# Patient Record
Sex: MALE | Race: WHITE | Employment: OTHER | URBAN - METROPOLITAN AREA
[De-identification: names, ages, dates, MRNs, and addresses within clinical notes are randomized per-mention and may not be internally consistent; named-entity substitution may affect disease eponyms.]

---

## 2017-01-01 ENCOUNTER — TELEPHONE (OUTPATIENT)
Dept: INTERNAL MEDICINE CLINIC | Facility: CLINIC | Age: 82
End: 2017-01-01

## 2017-01-01 ENCOUNTER — ANTI-COAG VISIT (OUTPATIENT)
Dept: INTERNAL MEDICINE CLINIC | Facility: CLINIC | Age: 82
End: 2017-01-01

## 2017-01-01 ENCOUNTER — PRIOR ORIGINAL RECORDS (OUTPATIENT)
Dept: OTHER | Age: 82
End: 2017-01-01

## 2017-01-01 ENCOUNTER — SNF VISIT (OUTPATIENT)
Dept: INTERNAL MEDICINE CLINIC | Facility: SKILLED NURSING FACILITY | Age: 82
End: 2017-01-01

## 2017-01-01 ENCOUNTER — OFFICE VISIT (OUTPATIENT)
Dept: INTERNAL MEDICINE CLINIC | Facility: CLINIC | Age: 82
End: 2017-01-01

## 2017-01-01 ENCOUNTER — APPOINTMENT (OUTPATIENT)
Dept: GENERAL RADIOLOGY | Facility: HOSPITAL | Age: 82
DRG: 820 | End: 2017-01-01
Attending: INTERNAL MEDICINE
Payer: MEDICARE

## 2017-01-01 ENCOUNTER — APPOINTMENT (OUTPATIENT)
Dept: CT IMAGING | Facility: HOSPITAL | Age: 82
DRG: 820 | End: 2017-01-01
Attending: NEUROLOGICAL SURGERY
Payer: MEDICARE

## 2017-01-01 ENCOUNTER — APPOINTMENT (OUTPATIENT)
Dept: GENERAL RADIOLOGY | Facility: HOSPITAL | Age: 82
DRG: 820 | End: 2017-01-01
Payer: MEDICARE

## 2017-01-01 ENCOUNTER — SURGERY (OUTPATIENT)
Age: 82
End: 2017-01-01

## 2017-01-01 ENCOUNTER — OFFICE VISIT (OUTPATIENT)
Dept: HEMATOLOGY/ONCOLOGY | Facility: HOSPITAL | Age: 82
End: 2017-01-01
Attending: INTERNAL MEDICINE
Payer: MEDICARE

## 2017-01-01 ENCOUNTER — APPOINTMENT (OUTPATIENT)
Dept: LAB | Age: 82
End: 2017-01-01
Attending: INTERNAL MEDICINE
Payer: MEDICARE

## 2017-01-01 ENCOUNTER — TELEPHONE (OUTPATIENT)
Dept: HEMATOLOGY/ONCOLOGY | Facility: HOSPITAL | Age: 82
End: 2017-01-01

## 2017-01-01 ENCOUNTER — OFFICE VISIT (OUTPATIENT)
Dept: RADIATION ONCOLOGY | Facility: HOSPITAL | Age: 82
End: 2017-01-01
Attending: RADIOLOGY
Payer: MEDICARE

## 2017-01-01 ENCOUNTER — APPOINTMENT (OUTPATIENT)
Dept: CT IMAGING | Facility: HOSPITAL | Age: 82
DRG: 871 | End: 2017-01-01
Attending: EMERGENCY MEDICINE
Payer: MEDICARE

## 2017-01-01 ENCOUNTER — TELEPHONE (OUTPATIENT)
Dept: RADIATION ONCOLOGY | Facility: HOSPITAL | Age: 82
End: 2017-01-01

## 2017-01-01 ENCOUNTER — APPOINTMENT (OUTPATIENT)
Dept: GENERAL RADIOLOGY | Facility: HOSPITAL | Age: 82
DRG: 820 | End: 2017-01-01
Attending: EMERGENCY MEDICINE
Payer: MEDICARE

## 2017-01-01 ENCOUNTER — HOSPITAL ENCOUNTER (INPATIENT)
Facility: HOSPITAL | Age: 82
LOS: 1 days | DRG: 871 | End: 2017-01-01
Attending: EMERGENCY MEDICINE | Admitting: INTERNAL MEDICINE
Payer: MEDICARE

## 2017-01-01 ENCOUNTER — APPOINTMENT (OUTPATIENT)
Dept: CT IMAGING | Facility: HOSPITAL | Age: 82
DRG: 820 | End: 2017-01-01
Attending: INTERNAL MEDICINE
Payer: MEDICARE

## 2017-01-01 ENCOUNTER — APPOINTMENT (OUTPATIENT)
Dept: GENERAL RADIOLOGY | Facility: HOSPITAL | Age: 82
DRG: 820 | End: 2017-01-01
Attending: HOSPITALIST
Payer: MEDICARE

## 2017-01-01 ENCOUNTER — APPOINTMENT (OUTPATIENT)
Dept: CT IMAGING | Facility: HOSPITAL | Age: 82
DRG: 820 | End: 2017-01-01
Attending: Other
Payer: MEDICARE

## 2017-01-01 ENCOUNTER — ANESTHESIA (OUTPATIENT)
Dept: SURGERY | Facility: HOSPITAL | Age: 82
DRG: 820 | End: 2017-01-01
Payer: MEDICARE

## 2017-01-01 ENCOUNTER — APPOINTMENT (OUTPATIENT)
Dept: CT IMAGING | Facility: HOSPITAL | Age: 82
DRG: 820 | End: 2017-01-01
Payer: MEDICARE

## 2017-01-01 ENCOUNTER — APPOINTMENT (OUTPATIENT)
Dept: RADIATION ONCOLOGY | Facility: HOSPITAL | Age: 82
DRG: 820 | End: 2017-01-01
Payer: MEDICARE

## 2017-01-01 ENCOUNTER — APPOINTMENT (OUTPATIENT)
Dept: GENERAL RADIOLOGY | Facility: HOSPITAL | Age: 82
DRG: 871 | End: 2017-01-01
Attending: EMERGENCY MEDICINE
Payer: MEDICARE

## 2017-01-01 ENCOUNTER — APPOINTMENT (OUTPATIENT)
Dept: CT IMAGING | Facility: HOSPITAL | Age: 82
DRG: 820 | End: 2017-01-01
Attending: HOSPITALIST
Payer: MEDICARE

## 2017-01-01 ENCOUNTER — APPOINTMENT (OUTPATIENT)
Dept: CV DIAGNOSTICS | Facility: HOSPITAL | Age: 82
DRG: 820 | End: 2017-01-01
Attending: INTERNAL MEDICINE
Payer: MEDICARE

## 2017-01-01 ENCOUNTER — ANESTHESIA EVENT (OUTPATIENT)
Dept: SURGERY | Facility: HOSPITAL | Age: 82
DRG: 820 | End: 2017-01-01
Payer: MEDICARE

## 2017-01-01 ENCOUNTER — OFFICE VISIT (OUTPATIENT)
Dept: PODIATRY CLINIC | Facility: CLINIC | Age: 82
End: 2017-01-01

## 2017-01-01 ENCOUNTER — TELEPHONE (OUTPATIENT)
Dept: ULTRASOUND IMAGING | Facility: HOSPITAL | Age: 82
End: 2017-01-01

## 2017-01-01 ENCOUNTER — HOSPITAL ENCOUNTER (INPATIENT)
Facility: HOSPITAL | Age: 82
LOS: 10 days | Discharge: NURSING, REHAB OR LONG-TERM CARE | DRG: 820 | End: 2017-01-01
Admitting: HOSPITALIST
Payer: MEDICARE

## 2017-01-01 VITALS
OXYGEN SATURATION: 97 % | RESPIRATION RATE: 20 BRPM | DIASTOLIC BLOOD PRESSURE: 74 MMHG | TEMPERATURE: 98 F | HEART RATE: 88 BPM | SYSTOLIC BLOOD PRESSURE: 101 MMHG

## 2017-01-01 VITALS — HEART RATE: 48 BPM | SYSTOLIC BLOOD PRESSURE: 114 MMHG | DIASTOLIC BLOOD PRESSURE: 56 MMHG | RESPIRATION RATE: 18 BRPM

## 2017-01-01 VITALS
HEART RATE: 78 BPM | OXYGEN SATURATION: 94 % | RESPIRATION RATE: 18 BRPM | HEIGHT: 68 IN | DIASTOLIC BLOOD PRESSURE: 64 MMHG | WEIGHT: 162 LBS | SYSTOLIC BLOOD PRESSURE: 100 MMHG | BODY MASS INDEX: 24.55 KG/M2 | TEMPERATURE: 99 F

## 2017-01-01 VITALS
TEMPERATURE: 97 F | RESPIRATION RATE: 25 BRPM | SYSTOLIC BLOOD PRESSURE: 42 MMHG | DIASTOLIC BLOOD PRESSURE: 11 MMHG | HEART RATE: 60 BPM | OXYGEN SATURATION: 55 %

## 2017-01-01 VITALS
TEMPERATURE: 97 F | DIASTOLIC BLOOD PRESSURE: 73 MMHG | WEIGHT: 139 LBS | BODY MASS INDEX: 22 KG/M2 | SYSTOLIC BLOOD PRESSURE: 115 MMHG | OXYGEN SATURATION: 95 % | HEART RATE: 67 BPM | RESPIRATION RATE: 18 BRPM

## 2017-01-01 VITALS
SYSTOLIC BLOOD PRESSURE: 118 MMHG | BODY MASS INDEX: 24.25 KG/M2 | TEMPERATURE: 98 F | HEIGHT: 66 IN | WEIGHT: 150.88 LBS | HEART RATE: 60 BPM | DIASTOLIC BLOOD PRESSURE: 71 MMHG | RESPIRATION RATE: 20 BRPM | OXYGEN SATURATION: 94 %

## 2017-01-01 VITALS
HEART RATE: 66 BPM | DIASTOLIC BLOOD PRESSURE: 83 MMHG | TEMPERATURE: 97 F | RESPIRATION RATE: 20 BRPM | SYSTOLIC BLOOD PRESSURE: 139 MMHG | OXYGEN SATURATION: 97 %

## 2017-01-01 VITALS
DIASTOLIC BLOOD PRESSURE: 78 MMHG | RESPIRATION RATE: 20 BRPM | DIASTOLIC BLOOD PRESSURE: 68 MMHG | HEART RATE: 70 BPM | TEMPERATURE: 97 F | OXYGEN SATURATION: 97 % | SYSTOLIC BLOOD PRESSURE: 150 MMHG | SYSTOLIC BLOOD PRESSURE: 120 MMHG | WEIGHT: 138.63 LBS | RESPIRATION RATE: 18 BRPM | TEMPERATURE: 97 F | HEART RATE: 78 BPM | BODY MASS INDEX: 22 KG/M2

## 2017-01-01 VITALS
TEMPERATURE: 97 F | BODY MASS INDEX: 24.25 KG/M2 | HEIGHT: 68 IN | WEIGHT: 160 LBS | HEART RATE: 84 BPM | SYSTOLIC BLOOD PRESSURE: 112 MMHG | RESPIRATION RATE: 16 BRPM | DIASTOLIC BLOOD PRESSURE: 72 MMHG

## 2017-01-01 VITALS
TEMPERATURE: 97 F | OXYGEN SATURATION: 98 % | HEART RATE: 68 BPM | DIASTOLIC BLOOD PRESSURE: 72 MMHG | SYSTOLIC BLOOD PRESSURE: 125 MMHG | RESPIRATION RATE: 20 BRPM

## 2017-01-01 VITALS
DIASTOLIC BLOOD PRESSURE: 74 MMHG | OXYGEN SATURATION: 97 % | TEMPERATURE: 97 F | SYSTOLIC BLOOD PRESSURE: 132 MMHG | RESPIRATION RATE: 20 BRPM | HEART RATE: 68 BPM

## 2017-01-01 VITALS
HEART RATE: 59 BPM | OXYGEN SATURATION: 97 % | TEMPERATURE: 97 F | DIASTOLIC BLOOD PRESSURE: 77 MMHG | SYSTOLIC BLOOD PRESSURE: 112 MMHG | RESPIRATION RATE: 20 BRPM

## 2017-01-01 VITALS
DIASTOLIC BLOOD PRESSURE: 77 MMHG | RESPIRATION RATE: 18 BRPM | OXYGEN SATURATION: 97 % | HEART RATE: 71 BPM | TEMPERATURE: 97 F | SYSTOLIC BLOOD PRESSURE: 119 MMHG

## 2017-01-01 DIAGNOSIS — I48.91 ATRIAL FIBRILLATION, UNSPECIFIED TYPE (HCC): Primary | ICD-10-CM

## 2017-01-01 DIAGNOSIS — R26.89 IMBALANCE: ICD-10-CM

## 2017-01-01 DIAGNOSIS — R29.898 WEAKNESS OF BOTH LEGS: ICD-10-CM

## 2017-01-01 DIAGNOSIS — R53.1 WEAKNESS: ICD-10-CM

## 2017-01-01 DIAGNOSIS — R41.89 UNRESPONSIVE EPISODE: ICD-10-CM

## 2017-01-01 DIAGNOSIS — Z95.0 PACEMAKER: ICD-10-CM

## 2017-01-01 DIAGNOSIS — R90.89 ABNORMAL BRAIN CT: ICD-10-CM

## 2017-01-01 DIAGNOSIS — D49.6 BT (BRAIN TUMOR) (HCC): Primary | ICD-10-CM

## 2017-01-01 DIAGNOSIS — I10 ESSENTIAL HYPERTENSION: ICD-10-CM
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
## 2017-01-01 DIAGNOSIS — J18.9 NOSOCOMIAL PNEUMONIA: ICD-10-CM

## 2017-01-01 DIAGNOSIS — E78.00 PURE HYPERCHOLESTEROLEMIA: ICD-10-CM

## 2017-01-01 DIAGNOSIS — R77.8 ELEVATED TROPONIN: ICD-10-CM

## 2017-01-01 DIAGNOSIS — R41.82 ALTERED MENTAL STATUS, UNSPECIFIED ALTERED MENTAL STATUS TYPE: ICD-10-CM

## 2017-01-01 DIAGNOSIS — I48.91 ATRIAL FIBRILLATION, UNSPECIFIED TYPE (HCC): ICD-10-CM

## 2017-01-01 DIAGNOSIS — R53.1 WEAKNESS: Primary | ICD-10-CM

## 2017-01-01 DIAGNOSIS — R65.20 SEVERE SEPSIS (HCC): Primary | ICD-10-CM

## 2017-01-01 DIAGNOSIS — D49.6 BT (BRAIN TUMOR) (HCC): ICD-10-CM

## 2017-01-01 DIAGNOSIS — Z71.89 GOALS OF CARE, COUNSELING/DISCUSSION: ICD-10-CM

## 2017-01-01 DIAGNOSIS — R29.898 WEAKNESS OF BOTH LEGS: Primary | ICD-10-CM

## 2017-01-01 DIAGNOSIS — I73.9 PVD (PERIPHERAL VASCULAR DISEASE) (HCC): ICD-10-CM

## 2017-01-01 DIAGNOSIS — D49.6 BRAIN TUMOR (HCC): Primary | ICD-10-CM
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
## 2017-01-01 DIAGNOSIS — G93.89 INTRACEREBRAL MASS: Primary | ICD-10-CM

## 2017-01-01 DIAGNOSIS — G91.2 NPH (NORMAL PRESSURE HYDROCEPHALUS) (HCC): Primary | ICD-10-CM

## 2017-01-01 DIAGNOSIS — B35.1 DERMATOPHYTOSIS OF NAIL: ICD-10-CM

## 2017-01-01 DIAGNOSIS — N39.490 OVERFLOW INCONTINENCE OF URINE: ICD-10-CM

## 2017-01-01 DIAGNOSIS — J96.01 ACUTE RESPIRATORY FAILURE WITH HYPOXIA (HCC): ICD-10-CM

## 2017-01-01 DIAGNOSIS — D49.6 BRAIN TUMOR (HCC): ICD-10-CM

## 2017-01-01 DIAGNOSIS — G91.2 NORMAL PRESSURE HYDROCEPHALUS (HCC): ICD-10-CM

## 2017-01-01 DIAGNOSIS — C85.89 PRIMARY CNS LYMPHOMA (HCC): ICD-10-CM

## 2017-01-01 DIAGNOSIS — I73.9 PVD (PERIPHERAL VASCULAR DISEASE) (HCC): Primary | ICD-10-CM

## 2017-01-01 DIAGNOSIS — R41.82 ALTERED MENTAL STATUS, UNSPECIFIED ALTERED MENTAL STATUS TYPE: Primary | ICD-10-CM

## 2017-01-01 DIAGNOSIS — I48.91 ATRIAL FIBRILLATION WITH RVR (HCC): ICD-10-CM

## 2017-01-01 DIAGNOSIS — A41.9 SEVERE SEPSIS (HCC): Primary | ICD-10-CM

## 2017-01-01 DIAGNOSIS — K59.00 CONSTIPATION, UNSPECIFIED CONSTIPATION TYPE: ICD-10-CM

## 2017-01-01 DIAGNOSIS — R41.0 CONFUSION: ICD-10-CM

## 2017-01-01 DIAGNOSIS — K04.7 ABSCESSED TOOTH: ICD-10-CM

## 2017-01-01 DIAGNOSIS — K04.7 ABSCESSED TOOTH: Primary | ICD-10-CM

## 2017-01-01 DIAGNOSIS — B35.1 DERMATOPHYTOSIS OF NAIL: Primary | ICD-10-CM

## 2017-01-01 DIAGNOSIS — I95.9 HYPOTENSION, UNSPECIFIED HYPOTENSION TYPE: ICD-10-CM

## 2017-01-01 DIAGNOSIS — I48.0 PAROXYSMAL ATRIAL FIBRILLATION (HCC): Primary | ICD-10-CM

## 2017-01-01 DIAGNOSIS — Z98.890 H/O ABDOMINAL AORTIC ANEURYSM REPAIR: ICD-10-CM

## 2017-01-01 LAB
ALBUMIN SERPL BCP-MCNC: 3.2 G/DL (ref 3.5–4.8)
ALBUMIN SERPL BCP-MCNC: 3.5 G/DL (ref 3.5–4.8)
ALBUMIN SERPL BCP-MCNC: 3.6 G/DL (ref 3.5–4.8)
ALBUMIN/GLOB SERPL: 1 {RATIO} (ref 1–2)
ALBUMIN/GLOB SERPL: 1.1 {RATIO} (ref 1–2)
ALP SERPL-CCNC: 56 U/L (ref 32–100)
ALP SERPL-CCNC: 63 U/L (ref 32–100)
ALP SERPL-CCNC: 80 U/L (ref 32–100)
ALT SERPL-CCNC: 36 U/L (ref 17–63)
ALT SERPL-CCNC: 39 U/L (ref 17–63)
ALT SERPL-CCNC: 51 U/L (ref 17–63)
ANION GAP SERPL CALC-SCNC: 10 MMOL/L (ref 0–18)
ANION GAP SERPL CALC-SCNC: 12 MMOL/L (ref 0–18)
ANION GAP SERPL CALC-SCNC: 19 MMOL/L (ref 0–18)
ANION GAP SERPL CALC-SCNC: 6 MMOL/L (ref 0–18)
ANION GAP SERPL CALC-SCNC: 7 MMOL/L (ref 0–18)
ANION GAP SERPL CALC-SCNC: 7 MMOL/L (ref 0–18)
ANION GAP SERPL CALC-SCNC: 8 MMOL/L (ref 0–18)
ANION GAP SERPL CALC-SCNC: 8 MMOL/L (ref 0–18)
ANION GAP SERPL CALC-SCNC: 9 MMOL/L (ref 0–18)
ANION GAP SERPL CALC-SCNC: 9 MMOL/L (ref 0–18)
ANTIBODY SCREEN: NEGATIVE
APTT PPP: 25.9 SECONDS (ref 23.2–35.3)
AST SERPL-CCNC: 35 U/L (ref 15–41)
AST SERPL-CCNC: 58 U/L (ref 15–41)
AST SERPL-CCNC: 77 U/L (ref 15–41)
BACTERIA UR QL AUTO: NEGATIVE /HPF
BASE EXCESS BLD CALC-SCNC: -13.8 MMOL/L (ref ?–2)
BASE EXCESS BLD CALC-SCNC: -2.3 MMOL/L (ref ?–2)
BASOPHILS # BLD: 0 K/UL (ref 0–0.2)
BASOPHILS # BLD: 0.1 K/UL (ref 0–0.2)
BASOPHILS NFR BLD: 0 %
BASOPHILS NFR BLD: 1 %
BASOPHILS NFR BLD: 1 %
BILIRUB DIRECT SERPL-MCNC: 0.4 MG/DL (ref 0–0.2)
BILIRUB SERPL-MCNC: 0.7 MG/DL (ref 0.3–1.2)
BILIRUB SERPL-MCNC: 1.4 MG/DL (ref 0.3–1.2)
BILIRUB SERPL-MCNC: 1.7 MG/DL (ref 0.3–1.2)
BILIRUB UR QL: NEGATIVE
BILIRUB UR QL: NEGATIVE
BLOOD TYPE BARCODE: 5100
BUN SERPL-MCNC: 20 MG/DL (ref 8–20)
BUN SERPL-MCNC: 23 MG/DL (ref 8–20)
BUN SERPL-MCNC: 26 MG/DL (ref 8–20)
BUN SERPL-MCNC: 27 MG/DL (ref 8–20)
BUN SERPL-MCNC: 28 MG/DL (ref 8–20)
BUN SERPL-MCNC: 29 MG/DL (ref 8–20)
BUN SERPL-MCNC: 29 MG/DL (ref 8–20)
BUN SERPL-MCNC: 32 MG/DL (ref 8–20)
BUN SERPL-MCNC: 34 MG/DL (ref 8–20)
BUN SERPL-MCNC: 70 MG/DL (ref 8–20)
BUN/CREAT SERPL: 15.2 (ref 10–20)
BUN/CREAT SERPL: 18.1 (ref 10–20)
BUN/CREAT SERPL: 19.3 (ref 10–20)
BUN/CREAT SERPL: 23.9 (ref 10–20)
BUN/CREAT SERPL: 23.9 (ref 10–20)
BUN/CREAT SERPL: 28.3 (ref 10–20)
BUN/CREAT SERPL: 28.6 (ref 10–20)
BUN/CREAT SERPL: 28.7 (ref 10–20)
BUN/CREAT SERPL: 31.2 (ref 10–20)
BUN/CREAT SERPL: 36.6 (ref 10–20)
CALCIUM SERPL-MCNC: 8.4 MG/DL (ref 8.5–10.5)
CALCIUM SERPL-MCNC: 8.5 MG/DL (ref 8.5–10.5)
CALCIUM SERPL-MCNC: 8.7 MG/DL (ref 8.5–10.5)
CALCIUM SERPL-MCNC: 8.8 MG/DL (ref 8.5–10.5)
CALCIUM SERPL-MCNC: 8.9 MG/DL (ref 8.5–10.5)
CALCIUM SERPL-MCNC: 9 MG/DL (ref 8.5–10.5)
CALCIUM SERPL-MCNC: 9 MG/DL (ref 8.5–10.5)
CALCIUM SERPL-MCNC: 9.3 MG/DL (ref 8.5–10.5)
CALCIUM SERPL-MCNC: 9.4 MG/DL (ref 8.5–10.5)
CALCIUM SERPL-MCNC: 9.4 MG/DL (ref 8.5–10.5)
CHLORIDE SERPL-SCNC: 100 MMOL/L (ref 95–110)
CHLORIDE SERPL-SCNC: 103 MMOL/L (ref 95–110)
CHLORIDE SERPL-SCNC: 103 MMOL/L (ref 95–110)
CHLORIDE SERPL-SCNC: 104 MMOL/L (ref 95–110)
CHLORIDE SERPL-SCNC: 105 MMOL/L (ref 95–110)
CHLORIDE SERPL-SCNC: 106 MMOL/L (ref 95–110)
CHLORIDE SERPL-SCNC: 106 MMOL/L (ref 95–110)
CHLORIDE SERPL-SCNC: 99 MMOL/L (ref 95–110)
CHOLEST SERPL-MCNC: 154 MG/DL (ref 110–200)
CHOLEST SERPL-MCNC: 155 MG/DL (ref 110–200)
CHOLEST SERPL-MCNC: 157 MG/DL (ref 110–200)
CK SERPL-CCNC: 820 U/L (ref 49–397)
CLARITY UR: CLEAR
CO2 SERPL-SCNC: 19 MMOL/L (ref 22–32)
CO2 SERPL-SCNC: 21 MMOL/L (ref 22–32)
CO2 SERPL-SCNC: 23 MMOL/L (ref 22–32)
CO2 SERPL-SCNC: 23 MMOL/L (ref 22–32)
CO2 SERPL-SCNC: 24 MMOL/L (ref 22–32)
CO2 SERPL-SCNC: 25 MMOL/L (ref 22–32)
CO2 SERPL-SCNC: 26 MMOL/L (ref 22–32)
CO2 SERPL-SCNC: 27 MMOL/L (ref 22–32)
COLOR UR: YELLOW
COLOR UR: YELLOW
CREAT SERPL-MCNC: 0.93 MG/DL (ref 0.5–1.5)
CREAT SERPL-MCNC: 0.93 MG/DL (ref 0.5–1.5)
CREAT SERPL-MCNC: 0.98 MG/DL (ref 0.5–1.5)
CREAT SERPL-MCNC: 1.01 MG/DL (ref 0.5–1.5)
CREAT SERPL-MCNC: 1.13 MG/DL (ref 0.5–1.5)
CREAT SERPL-MCNC: 1.13 MG/DL (ref 0.5–1.5)
CREAT SERPL-MCNC: 1.27 MG/DL (ref 0.5–1.5)
CREAT SERPL-MCNC: 1.32 MG/DL (ref 0.5–1.5)
CREAT SERPL-MCNC: 1.35 MG/DL (ref 0.5–1.5)
CREAT SERPL-MCNC: 2.93 MG/DL (ref 0.5–1.5)
EOSINOPHIL # BLD: 0 K/UL (ref 0–0.7)
EOSINOPHIL # BLD: 0.2 K/UL (ref 0–0.7)
EOSINOPHIL NFR BLD: 0 %
EOSINOPHIL NFR BLD: 2 %
ERYTHROCYTE [DISTWIDTH] IN BLOOD BY AUTOMATED COUNT: 13.8 % (ref 11–15)
ERYTHROCYTE [DISTWIDTH] IN BLOOD BY AUTOMATED COUNT: 13.9 % (ref 11–15)
ERYTHROCYTE [DISTWIDTH] IN BLOOD BY AUTOMATED COUNT: 13.9 % (ref 11–15)
ERYTHROCYTE [DISTWIDTH] IN BLOOD BY AUTOMATED COUNT: 14.1 % (ref 11–15)
ERYTHROCYTE [DISTWIDTH] IN BLOOD BY AUTOMATED COUNT: 14.3 % (ref 11–15)
ERYTHROCYTE [DISTWIDTH] IN BLOOD BY AUTOMATED COUNT: 14.5 % (ref 11–15)
ERYTHROCYTE [DISTWIDTH] IN BLOOD BY AUTOMATED COUNT: 14.6 % (ref 11–15)
ERYTHROCYTE [DISTWIDTH] IN BLOOD BY AUTOMATED COUNT: 15.7 % (ref 11–15)
GLOBULIN PLAS-MCNC: 3.2 G/DL (ref 2.5–3.7)
GLOBULIN PLAS-MCNC: 3.4 G/DL (ref 2.5–3.7)
GLUCOSE BLDC GLUCOMTR-MCNC: 111 MG/DL (ref 70–99)
GLUCOSE BLDC GLUCOMTR-MCNC: 113 MG/DL (ref 70–99)
GLUCOSE BLDC GLUCOMTR-MCNC: 113 MG/DL (ref 70–99)
GLUCOSE BLDC GLUCOMTR-MCNC: 119 MG/DL (ref 70–99)
GLUCOSE BLDC GLUCOMTR-MCNC: 122 MG/DL (ref 70–99)
GLUCOSE BLDC GLUCOMTR-MCNC: 123 MG/DL (ref 70–99)
GLUCOSE BLDC GLUCOMTR-MCNC: 127 MG/DL (ref 70–99)
GLUCOSE BLDC GLUCOMTR-MCNC: 131 MG/DL (ref 70–99)
GLUCOSE BLDC GLUCOMTR-MCNC: 131 MG/DL (ref 70–99)
GLUCOSE BLDC GLUCOMTR-MCNC: 132 MG/DL (ref 70–99)
GLUCOSE BLDC GLUCOMTR-MCNC: 132 MG/DL (ref 70–99)
GLUCOSE BLDC GLUCOMTR-MCNC: 134 MG/DL (ref 70–99)
GLUCOSE BLDC GLUCOMTR-MCNC: 137 MG/DL (ref 70–99)
GLUCOSE BLDC GLUCOMTR-MCNC: 138 MG/DL (ref 70–99)
GLUCOSE BLDC GLUCOMTR-MCNC: 139 MG/DL (ref 70–99)
GLUCOSE BLDC GLUCOMTR-MCNC: 139 MG/DL (ref 70–99)
GLUCOSE BLDC GLUCOMTR-MCNC: 143 MG/DL (ref 70–99)
GLUCOSE BLDC GLUCOMTR-MCNC: 147 MG/DL (ref 70–99)
GLUCOSE BLDC GLUCOMTR-MCNC: 149 MG/DL (ref 70–99)
GLUCOSE BLDC GLUCOMTR-MCNC: 150 MG/DL (ref 70–99)
GLUCOSE BLDC GLUCOMTR-MCNC: 151 MG/DL (ref 70–99)
GLUCOSE BLDC GLUCOMTR-MCNC: 153 MG/DL (ref 70–99)
GLUCOSE BLDC GLUCOMTR-MCNC: 154 MG/DL (ref 70–99)
GLUCOSE BLDC GLUCOMTR-MCNC: 156 MG/DL (ref 70–99)
GLUCOSE BLDC GLUCOMTR-MCNC: 157 MG/DL (ref 70–99)
GLUCOSE BLDC GLUCOMTR-MCNC: 159 MG/DL (ref 70–99)
GLUCOSE BLDC GLUCOMTR-MCNC: 164 MG/DL (ref 70–99)
GLUCOSE BLDC GLUCOMTR-MCNC: 166 MG/DL (ref 70–99)
GLUCOSE BLDC GLUCOMTR-MCNC: 169 MG/DL (ref 70–99)
GLUCOSE BLDC GLUCOMTR-MCNC: 171 MG/DL (ref 70–99)
GLUCOSE BLDC GLUCOMTR-MCNC: 176 MG/DL (ref 70–99)
GLUCOSE BLDC GLUCOMTR-MCNC: 177 MG/DL (ref 70–99)
GLUCOSE BLDC GLUCOMTR-MCNC: 183 MG/DL (ref 70–99)
GLUCOSE BLDC GLUCOMTR-MCNC: 184 MG/DL (ref 70–99)
GLUCOSE BLDC GLUCOMTR-MCNC: 184 MG/DL (ref 70–99)
GLUCOSE BLDC GLUCOMTR-MCNC: 194 MG/DL (ref 70–99)
GLUCOSE BLDC GLUCOMTR-MCNC: 200 MG/DL (ref 70–99)
GLUCOSE BLDC GLUCOMTR-MCNC: 224 MG/DL (ref 70–99)
GLUCOSE BLDC GLUCOMTR-MCNC: 227 MG/DL (ref 70–99)
GLUCOSE BLDC GLUCOMTR-MCNC: 237 MG/DL (ref 70–99)
GLUCOSE SERPL-MCNC: 126 MG/DL (ref 70–99)
GLUCOSE SERPL-MCNC: 136 MG/DL (ref 70–99)
GLUCOSE SERPL-MCNC: 152 MG/DL (ref 70–99)
GLUCOSE SERPL-MCNC: 154 MG/DL (ref 70–99)
GLUCOSE SERPL-MCNC: 167 MG/DL (ref 70–99)
GLUCOSE SERPL-MCNC: 170 MG/DL (ref 70–99)
GLUCOSE SERPL-MCNC: 170 MG/DL (ref 70–99)
GLUCOSE SERPL-MCNC: 181 MG/DL (ref 70–99)
GLUCOSE SERPL-MCNC: 186 MG/DL (ref 70–99)
GLUCOSE SERPL-MCNC: 98 MG/DL (ref 70–99)
GLUCOSE UR-MCNC: NEGATIVE MG/DL
GLUCOSE UR-MCNC: NEGATIVE MG/DL
HBA1C MFR BLD: 5.8 % (ref 4–6)
HCO3 BLDA-SCNC: 11.3 MEQ/L (ref 21–27)
HCO3 BLDA-SCNC: 20.4 MEQ/L (ref 21–27)
HCT VFR BLD AUTO: 39.1 % (ref 41–52)
HCT VFR BLD AUTO: 40 % (ref 41–52)
HCT VFR BLD AUTO: 40.2 % (ref 41–52)
HCT VFR BLD AUTO: 40.5 % (ref 41–52)
HCT VFR BLD AUTO: 40.6 % (ref 41–52)
HCT VFR BLD AUTO: 42.4 % (ref 41–52)
HCT VFR BLD AUTO: 43.8 % (ref 41–52)
HCT VFR BLD AUTO: 44.7 % (ref 41–52)
HCT VFR BLD AUTO: 47.3 % (ref 41–52)
HCT VFR BLD AUTO: 55.2 % (ref 41–52)
HDLC SERPL-MCNC: 31 MG/DL
HDLC SERPL-MCNC: 39 MG/DL
HDLC SERPL-MCNC: 40 MG/DL
HGB BLD-MCNC: 13.5 G/DL (ref 13.5–17.5)
HGB BLD-MCNC: 13.8 G/DL (ref 13.5–17.5)
HGB BLD-MCNC: 13.9 G/DL (ref 13.5–17.5)
HGB BLD-MCNC: 14.3 G/DL (ref 13.5–17.5)
HGB BLD-MCNC: 14.7 G/DL (ref 13.5–17.5)
HGB BLD-MCNC: 14.9 G/DL (ref 13.5–17.5)
HGB BLD-MCNC: 15.8 G/DL (ref 13.5–17.5)
HGB BLD-MCNC: 18.3 G/DL (ref 13.5–17.5)
HGB UR QL STRIP.AUTO: NEGATIVE
HYALINE CASTS #/AREA URNS AUTO: 1 /LPF
INR BLD: 1.2 (ref 0.9–1.2)
INR BLD: 1.2 (ref 0.9–1.2)
INR BLD: 1.6 (ref 0.9–1.2)
INR BLD: 1.7 (ref 0.9–1.2)
INR BLD: 2.1 (ref 0.9–1.2)
INR BLD: 2.3 (ref 0.9–1.2)
INR BLD: 2.4 (ref 0.9–1.2)
INR: 1.3 (ref 0.8–1.2)
INR: 2.2 (ref 0.8–1.2)
INR: 2.8 (ref 0.8–1.2)
INR: 3.5 (ref 0.8–1.2)
INR: 3.5 (ref 0.8–1.2)
KETONES UR-MCNC: 20 MG/DL
KETONES UR-MCNC: NEGATIVE MG/DL
LACTATE SERPL-SCNC: 7.6 MMOL/L (ref 0.5–2.2)
LACTATE SERPL-SCNC: 8.2 MMOL/L (ref 0.5–2.2)
LDLC SERPL CALC-MCNC: 61 MG/DL (ref 0–99)
LDLC SERPL CALC-MCNC: 79 MG/DL (ref 0–99)
LDLC SERPL CALC-MCNC: 81 MG/DL (ref 0–99)
LEUKOCYTE ESTERASE UR QL STRIP.AUTO: NEGATIVE
LEUKOCYTE ESTERASE UR QL STRIP.AUTO: NEGATIVE
LYMPHOCYTES # BLD: 0.4 K/UL (ref 1–4)
LYMPHOCYTES # BLD: 0.7 K/UL (ref 1–4)
LYMPHOCYTES # BLD: 0.8 K/UL (ref 1–4)
LYMPHOCYTES # BLD: 0.8 K/UL (ref 1–4)
LYMPHOCYTES # BLD: 0.9 K/UL (ref 1–4)
LYMPHOCYTES # BLD: 1 K/UL (ref 1–4)
LYMPHOCYTES # BLD: 1.6 K/UL (ref 1–4)
LYMPHOCYTES # BLD: 2.4 K/UL (ref 1–4)
LYMPHOCYTES NFR BLD: 11 %
LYMPHOCYTES NFR BLD: 11 %
LYMPHOCYTES NFR BLD: 12 %
LYMPHOCYTES NFR BLD: 13 %
LYMPHOCYTES NFR BLD: 13 %
LYMPHOCYTES NFR BLD: 3 %
LYMPHOCYTES NFR BLD: 36 %
LYMPHOCYTES NFR BLD: 7 %
LYMPHOCYTES NFR BLD: 8 %
LYMPHOCYTES NFR BLD: 9 %
MAGNESIUM SERPL-MCNC: 1.8 MG/DL (ref 1.8–2.5)
MAGNESIUM SERPL-MCNC: 1.8 MG/DL (ref 1.8–2.5)
MAGNESIUM SERPL-MCNC: 2.3 MG/DL (ref 1.8–2.5)
MCH RBC QN AUTO: 30.7 PG (ref 27–32)
MCH RBC QN AUTO: 31 PG (ref 27–32)
MCH RBC QN AUTO: 31 PG (ref 27–32)
MCH RBC QN AUTO: 31.2 PG (ref 27–32)
MCH RBC QN AUTO: 31.2 PG (ref 27–32)
MCH RBC QN AUTO: 31.3 PG (ref 27–32)
MCH RBC QN AUTO: 31.3 PG (ref 27–32)
MCH RBC QN AUTO: 31.5 PG (ref 27–32)
MCH RBC QN AUTO: 31.5 PG (ref 27–32)
MCH RBC QN AUTO: 31.7 PG (ref 27–32)
MCHC RBC AUTO-ENTMCNC: 33.1 G/DL (ref 32–37)
MCHC RBC AUTO-ENTMCNC: 33.4 G/DL (ref 32–37)
MCHC RBC AUTO-ENTMCNC: 33.4 G/DL (ref 32–37)
MCHC RBC AUTO-ENTMCNC: 33.7 G/DL (ref 32–37)
MCHC RBC AUTO-ENTMCNC: 33.8 G/DL (ref 32–37)
MCHC RBC AUTO-ENTMCNC: 33.9 G/DL (ref 32–37)
MCHC RBC AUTO-ENTMCNC: 34.3 G/DL (ref 32–37)
MCHC RBC AUTO-ENTMCNC: 34.3 G/DL (ref 32–37)
MCHC RBC AUTO-ENTMCNC: 34.4 G/DL (ref 32–37)
MCHC RBC AUTO-ENTMCNC: 34.5 G/DL (ref 32–37)
MCV RBC AUTO: 91.3 FL (ref 80–100)
MCV RBC AUTO: 91.6 FL (ref 80–100)
MCV RBC AUTO: 91.7 FL (ref 80–100)
MCV RBC AUTO: 91.8 FL (ref 80–100)
MCV RBC AUTO: 91.8 FL (ref 80–100)
MCV RBC AUTO: 92.1 FL (ref 80–100)
MCV RBC AUTO: 92.2 FL (ref 80–100)
MCV RBC AUTO: 92.3 FL (ref 80–100)
MCV RBC AUTO: 92.8 FL (ref 80–100)
MCV RBC AUTO: 94.3 FL (ref 80–100)
METAMYELOCYTES # BLD MANUAL: 0.51 K/UL
METAMYELOCYTES # BLD MANUAL: 0.71 K/UL
METAMYELOCYTES NFR BLD: 7 %
MODIFIED ALLEN TEST: POSITIVE
MONOCYTES # BLD: 0.2 K/UL (ref 0–1)
MONOCYTES # BLD: 0.5 K/UL (ref 0–1)
MONOCYTES # BLD: 0.6 K/UL (ref 0–1)
MONOCYTES # BLD: 0.6 K/UL (ref 0–1)
MONOCYTES # BLD: 0.7 K/UL (ref 0–1)
MONOCYTES # BLD: 0.8 K/UL (ref 0–1)
MONOCYTES # BLD: 0.9 K/UL (ref 0–1)
MONOCYTES # BLD: 1 K/UL (ref 0–1)
MONOCYTES NFR BLD: 10 %
MONOCYTES NFR BLD: 2 %
MONOCYTES NFR BLD: 7 %
MONOCYTES NFR BLD: 8 %
MONOCYTES NFR BLD: 9 %
MRSA DNA SPEC QL NAA+PROBE: NEGATIVE
MRSA DNA SPEC QL NAA+PROBE: POSITIVE
MYELOCYTES NFR BLD: 5 %
NEUTROPHILS # BLD AUTO: 3.6 K/UL (ref 1.8–7.7)
NEUTROPHILS # BLD AUTO: 5.3 K/UL (ref 1.8–7.7)
NEUTROPHILS # BLD AUTO: 6 K/UL (ref 1.8–7.7)
NEUTROPHILS # BLD AUTO: 6.4 K/UL (ref 1.8–7.7)
NEUTROPHILS # BLD AUTO: 6.8 K/UL (ref 1.8–7.7)
NEUTROPHILS # BLD AUTO: 7.1 K/UL (ref 1.8–7.7)
NEUTROPHILS # BLD AUTO: 7.4 K/UL (ref 1.8–7.7)
NEUTROPHILS # BLD AUTO: 7.6 K/UL (ref 1.8–7.7)
NEUTROPHILS # BLD AUTO: 8 K/UL (ref 1.8–7.7)
NEUTROPHILS # BLD AUTO: 9.4 K/UL (ref 1.8–7.7)
NEUTROPHILS NFR BLD: 52 %
NEUTROPHILS NFR BLD: 53 %
NEUTROPHILS NFR BLD: 78 %
NEUTROPHILS NFR BLD: 79 %
NEUTROPHILS NFR BLD: 80 %
NEUTROPHILS NFR BLD: 81 %
NEUTROPHILS NFR BLD: 83 %
NEUTROPHILS NFR BLD: 84 %
NEUTROPHILS NFR BLD: 85 %
NEUTROPHILS NFR BLD: 86 %
NEUTS BAND NFR BLD: 22 %
NITRITE UR QL STRIP.AUTO: NEGATIVE
NITRITE UR QL STRIP.AUTO: NEGATIVE
NONHDLC SERPL-MCNC: 115 MG/DL
NONHDLC SERPL-MCNC: 117 MG/DL
NONHDLC SERPL-MCNC: 124 MG/DL
O2 CT BLD-SCNC: 17.5 VOL% (ref 15–23)
O2 CT BLD-SCNC: 17.9 VOL% (ref 15–23)
OSMOLALITY UR CALC.SUM OF ELEC: 290 MOSM/KG (ref 275–295)
OSMOLALITY UR CALC.SUM OF ELEC: 292 MOSM/KG (ref 275–295)
OSMOLALITY UR CALC.SUM OF ELEC: 292 MOSM/KG (ref 275–295)
OSMOLALITY UR CALC.SUM OF ELEC: 293 MOSM/KG (ref 275–295)
OSMOLALITY UR CALC.SUM OF ELEC: 295 MOSM/KG (ref 275–295)
OSMOLALITY UR CALC.SUM OF ELEC: 307 MOSM/KG (ref 275–295)
OXYGEN LITERS/MINUTE: 15 L/MIN
OXYGEN LITERS/MINUTE: 2 L/MIN
PCO2 BLDA: 25 MM HG (ref 35–45)
PCO2 BLDA: 31 MM HG (ref 35–45)
PH BLDA: 7.28 [PH] (ref 7.35–7.45)
PH BLDA: 7.44 [PH] (ref 7.35–7.45)
PH UR: 5 [PH] (ref 5–8)
PH UR: 6 [PH] (ref 5–8)
PLATELET # BLD AUTO: 130 K/UL (ref 140–400)
PLATELET # BLD AUTO: 134 K/UL (ref 140–400)
PLATELET # BLD AUTO: 136 K/UL (ref 140–400)
PLATELET # BLD AUTO: 139 K/UL (ref 140–400)
PLATELET # BLD AUTO: 147 K/UL (ref 140–400)
PLATELET # BLD AUTO: 155 K/UL (ref 140–400)
PLATELET # BLD AUTO: 156 K/UL (ref 140–400)
PLATELET # BLD AUTO: 163 K/UL (ref 140–400)
PLATELET # BLD AUTO: 186 K/UL (ref 140–400)
PLATELET # BLD AUTO: 200 K/UL (ref 140–400)
PMV BLD AUTO: 10.3 FL (ref 7.4–10.3)
PMV BLD AUTO: 8.7 FL (ref 7.4–10.3)
PMV BLD AUTO: 9 FL (ref 7.4–10.3)
PMV BLD AUTO: 9.2 FL (ref 7.4–10.3)
PMV BLD AUTO: 9.5 FL (ref 7.4–10.3)
PMV BLD AUTO: 9.6 FL (ref 7.4–10.3)
PMV BLD AUTO: 9.7 FL (ref 7.4–10.3)
PMV BLD AUTO: 9.8 FL (ref 7.4–10.3)
PMV BLD AUTO: 9.8 FL (ref 7.4–10.3)
PMV BLD AUTO: 9.9 FL (ref 7.4–10.3)
PO2 BLDA: 103 MM HG (ref 80–100)
PO2 BLDA: 90 MM HG (ref 80–100)
PO2 SATN ADJ TO 0.5 BLD: 25 MMHG (ref 24–28)
PO2 SATN ADJ TO 0.5 BLD: 31 MMHG (ref 24–28)
POTASSIUM SERPL-SCNC: 3.8 MMOL/L (ref 3.3–5.1)
POTASSIUM SERPL-SCNC: 4 MMOL/L (ref 3.3–5.1)
POTASSIUM SERPL-SCNC: 4.2 MMOL/L (ref 3.3–5.1)
POTASSIUM SERPL-SCNC: 4.2 MMOL/L (ref 3.3–5.1)
POTASSIUM SERPL-SCNC: 4.3 MMOL/L (ref 3.3–5.1)
POTASSIUM SERPL-SCNC: 4.7 MMOL/L (ref 3.3–5.1)
POTASSIUM SERPL-SCNC: 5.5 MMOL/L (ref 3.3–5.1)
PROT SERPL-MCNC: 6.4 G/DL (ref 5.9–8.4)
PROT SERPL-MCNC: 7 G/DL (ref 5.9–8.4)
PROT SERPL-MCNC: 7.3 G/DL (ref 5.9–8.4)
PROT UR-MCNC: 30 MG/DL
PROT UR-MCNC: NEGATIVE MG/DL
PROTHROMBIN TIME: 14.6 SECONDS (ref 11.8–14.5)
PROTHROMBIN TIME: 15 SECONDS (ref 11.8–14.5)
PROTHROMBIN TIME: 18.6 SECONDS (ref 11.8–14.5)
PROTHROMBIN TIME: 19.8 SECONDS (ref 11.8–14.5)
PROTHROMBIN TIME: 23.5 SECONDS (ref 11.8–14.5)
PROTHROMBIN TIME: 24.8 SECONDS (ref 11.8–14.5)
PROTHROMBIN TIME: 25.6 SECONDS (ref 11.8–14.5)
PUNCTURE CHARGE: YES
PUNCTURE CHARGE: YES
RBC # BLD AUTO: 4.27 M/UL (ref 4.5–5.9)
RBC # BLD AUTO: 4.36 M/UL (ref 4.5–5.9)
RBC # BLD AUTO: 4.38 M/UL (ref 4.5–5.9)
RBC # BLD AUTO: 4.4 M/UL (ref 4.5–5.9)
RBC # BLD AUTO: 4.44 M/UL (ref 4.5–5.9)
RBC # BLD AUTO: 4.59 M/UL (ref 4.5–5.9)
RBC # BLD AUTO: 4.75 M/UL (ref 4.5–5.9)
RBC # BLD AUTO: 4.87 M/UL (ref 4.5–5.9)
RBC # BLD AUTO: 5.1 M/UL (ref 4.5–5.9)
RBC # BLD AUTO: 5.86 M/UL (ref 4.5–5.9)
RBC #/AREA URNS AUTO: 3 /HPF
RBC #/AREA URNS AUTO: <1 /HPF
RH BLOOD TYPE: POSITIVE
SAO2 % BLDA: 95.7 % (ref 94–100)
SAO2 % BLDA: 98 % (ref 94–100)
SODIUM SERPL-SCNC: 135 MMOL/L (ref 136–144)
SODIUM SERPL-SCNC: 136 MMOL/L (ref 136–144)
SODIUM SERPL-SCNC: 137 MMOL/L (ref 136–144)
SODIUM SERPL-SCNC: 138 MMOL/L (ref 136–144)
SODIUM SERPL-SCNC: 139 MMOL/L (ref 136–144)
SODIUM SERPL-SCNC: 140 MMOL/L (ref 136–144)
SP GR UR STRIP: 1.02 (ref 1–1.03)
SP GR UR STRIP: 1.03 (ref 1–1.03)
TEST STRIP EXPIRATION DATE: ABNORMAL DATE
TRIGL SERPL-MCNC: 192 MG/DL (ref 1–149)
TRIGL SERPL-MCNC: 213 MG/DL (ref 1–149)
TRIGL SERPL-MCNC: 272 MG/DL (ref 1–149)
TROPONIN I SERPL-MCNC: 0.03 NG/ML (ref ?–0.03)
TROPONIN I SERPL-MCNC: 0.05 NG/ML (ref ?–0.03)
TROPONIN I SERPL-MCNC: 0.06 NG/ML (ref ?–0.03)
TSH SERPL-ACNC: 1.06 UIU/ML (ref 0.34–5.6)
UROBILINOGEN UR STRIP-ACNC: <2
UROBILINOGEN UR STRIP-ACNC: <2
VARIANT LYMPHS NFR BLD MANUAL: 1 %
VIT C UR-MCNC: 20 MG/DL
VIT C UR-MCNC: NEGATIVE MG/DL
WBC # BLD AUTO: 10.2 K/UL (ref 4–11)
WBC # BLD AUTO: 12 K/UL (ref 4–11)
WBC # BLD AUTO: 6.7 K/UL (ref 4–11)
WBC # BLD AUTO: 6.9 K/UL (ref 4–11)
WBC # BLD AUTO: 7.6 K/UL (ref 4–11)
WBC # BLD AUTO: 7.8 K/UL (ref 4–11)
WBC # BLD AUTO: 7.9 K/UL (ref 4–11)
WBC # BLD AUTO: 8.8 K/UL (ref 4–11)
WBC # BLD AUTO: 8.8 K/UL (ref 4–11)
WBC # BLD AUTO: 9.5 K/UL (ref 4–11)
WBC #/AREA URNS AUTO: 1 /HPF
WBC #/AREA URNS AUTO: 3 /HPF

## 2017-01-01 PROCEDURE — 71010 XR CHEST AP PORTABLE  (CPT=71010): CPT | Performed by: HOSPITALIST

## 2017-01-01 PROCEDURE — 85610 PROTHROMBIN TIME: CPT

## 2017-01-01 PROCEDURE — 81001 URINALYSIS AUTO W/SCOPE: CPT

## 2017-01-01 PROCEDURE — 77280 THER RAD SIMULAJ FIELD SMPL: CPT | Performed by: RADIOLOGY

## 2017-01-01 PROCEDURE — 85025 COMPLETE CBC W/AUTO DIFF WBC: CPT | Performed by: EMERGENCY MEDICINE

## 2017-01-01 PROCEDURE — 99214 OFFICE O/P EST MOD 30 MIN: CPT | Performed by: INTERNAL MEDICINE

## 2017-01-01 PROCEDURE — 99232 SBSQ HOSP IP/OBS MODERATE 35: CPT | Performed by: HOSPITALIST

## 2017-01-01 PROCEDURE — G0463 HOSPITAL OUTPT CLINIC VISIT: HCPCS

## 2017-01-01 PROCEDURE — 80053 COMPREHEN METABOLIC PANEL: CPT

## 2017-01-01 PROCEDURE — 99231 SBSQ HOSP IP/OBS SF/LOW 25: CPT | Performed by: REGISTERED NURSE

## 2017-01-01 PROCEDURE — 71010 XR CHEST AP PORTABLE  (CPT=71010): CPT | Performed by: INTERNAL MEDICINE

## 2017-01-01 PROCEDURE — 93010 ELECTROCARDIOGRAM REPORT: CPT | Performed by: EMERGENCY MEDICINE

## 2017-01-01 PROCEDURE — 77334 RADIATION TREATMENT AID(S): CPT | Performed by: RADIOLOGY

## 2017-01-01 PROCEDURE — 99231 SBSQ HOSP IP/OBS SF/LOW 25: CPT | Performed by: OTHER

## 2017-01-01 PROCEDURE — 99233 SBSQ HOSP IP/OBS HIGH 50: CPT | Performed by: HOSPITALIST

## 2017-01-01 PROCEDURE — 93005 ELECTROCARDIOGRAM TRACING: CPT

## 2017-01-01 PROCEDURE — 80061 LIPID PANEL: CPT

## 2017-01-01 PROCEDURE — 74177 CT ABD & PELVIS W/CONTRAST: CPT | Performed by: INTERNAL MEDICINE

## 2017-01-01 PROCEDURE — 85025 COMPLETE CBC W/AUTO DIFF WBC: CPT | Performed by: INTERNAL MEDICINE

## 2017-01-01 PROCEDURE — 84484 ASSAY OF TROPONIN QUANT: CPT | Performed by: EMERGENCY MEDICINE

## 2017-01-01 PROCEDURE — 36415 COLL VENOUS BLD VENIPUNCTURE: CPT

## 2017-01-01 PROCEDURE — 99232 SBSQ HOSP IP/OBS MODERATE 35: CPT | Performed by: INTERNAL MEDICINE

## 2017-01-01 PROCEDURE — 71010 XR CHEST AP PORTABLE  (CPT=71010): CPT | Performed by: EMERGENCY MEDICINE

## 2017-01-01 PROCEDURE — 36416 COLLJ CAPILLARY BLOOD SPEC: CPT

## 2017-01-01 PROCEDURE — 77412 RADIATION TX DELIVERY LVL 3: CPT | Performed by: RADIOLOGY

## 2017-01-01 PROCEDURE — 99215 OFFICE O/P EST HI 40 MIN: CPT | Performed by: INTERNAL MEDICINE

## 2017-01-01 PROCEDURE — G0463 HOSPITAL OUTPT CLINIC VISIT: HCPCS | Performed by: INTERNAL MEDICINE

## 2017-01-01 PROCEDURE — 5A2204Z RESTORATION OF CARDIAC RHYTHM, SINGLE: ICD-10-PCS | Performed by: EMERGENCY MEDICINE

## 2017-01-01 PROCEDURE — 99291 CRITICAL CARE FIRST HOUR: CPT | Performed by: INTERNAL MEDICINE

## 2017-01-01 PROCEDURE — 80061 LIPID PANEL: CPT | Performed by: EMERGENCY MEDICINE

## 2017-01-01 PROCEDURE — 83605 ASSAY OF LACTIC ACID: CPT | Performed by: EMERGENCY MEDICINE

## 2017-01-01 PROCEDURE — 93306 TTE W/DOPPLER COMPLETE: CPT | Performed by: INTERNAL MEDICINE

## 2017-01-01 PROCEDURE — 99233 SBSQ HOSP IP/OBS HIGH 50: CPT | Performed by: INTERNAL MEDICINE

## 2017-01-01 PROCEDURE — 87641 MR-STAPH DNA AMP PROBE: CPT | Performed by: EMERGENCY MEDICINE

## 2017-01-01 PROCEDURE — 87040 BLOOD CULTURE FOR BACTERIA: CPT | Performed by: EMERGENCY MEDICINE

## 2017-01-01 PROCEDURE — 99232 SBSQ HOSP IP/OBS MODERATE 35: CPT | Performed by: OTHER

## 2017-01-01 PROCEDURE — 99233 SBSQ HOSP IP/OBS HIGH 50: CPT | Performed by: OTHER

## 2017-01-01 PROCEDURE — 99221 1ST HOSP IP/OBS SF/LOW 40: CPT | Performed by: REGISTERED NURSE

## 2017-01-01 PROCEDURE — 2W30XYZ IMMOBILIZATION OF HEAD USING OTHER DEVICE: ICD-10-PCS | Performed by: NEUROLOGICAL SURGERY

## 2017-01-01 PROCEDURE — 36556 INSERT NON-TUNNEL CV CATH: CPT

## 2017-01-01 PROCEDURE — 96368 THER/DIAG CONCURRENT INF: CPT

## 2017-01-01 PROCEDURE — 99223 1ST HOSP IP/OBS HIGH 75: CPT | Performed by: INTERNAL MEDICINE

## 2017-01-01 PROCEDURE — G0127 TRIM NAIL(S): HCPCS

## 2017-01-01 PROCEDURE — 77290 THER RAD SIMULAJ FIELD CPLX: CPT | Performed by: RADIOLOGY

## 2017-01-01 PROCEDURE — 36600 WITHDRAWAL OF ARTERIAL BLOOD: CPT | Performed by: EMERGENCY MEDICINE

## 2017-01-01 PROCEDURE — 70460 CT HEAD/BRAIN W/DYE: CPT | Performed by: OTHER

## 2017-01-01 PROCEDURE — 85610 PROTHROMBIN TIME: CPT | Performed by: EMERGENCY MEDICINE

## 2017-01-01 PROCEDURE — 77295 3-D RADIOTHERAPY PLAN: CPT | Performed by: RADIOLOGY

## 2017-01-01 PROCEDURE — 72170 X-RAY EXAM OF PELVIS: CPT | Performed by: EMERGENCY MEDICINE

## 2017-01-01 PROCEDURE — 99212 OFFICE O/P EST SF 10 MIN: CPT

## 2017-01-01 PROCEDURE — 99291 CRITICAL CARE FIRST HOUR: CPT

## 2017-01-01 PROCEDURE — 84443 ASSAY THYROID STIM HORMONE: CPT

## 2017-01-01 PROCEDURE — 36416 COLLJ CAPILLARY BLOOD SPEC: CPT | Performed by: INTERNAL MEDICINE

## 2017-01-01 PROCEDURE — 70450 CT HEAD/BRAIN W/O DYE: CPT

## 2017-01-01 PROCEDURE — 99232 SBSQ HOSP IP/OBS MODERATE 35: CPT | Performed by: REGISTERED NURSE

## 2017-01-01 PROCEDURE — 99223 1ST HOSP IP/OBS HIGH 75: CPT | Performed by: HOSPITALIST

## 2017-01-01 PROCEDURE — 85730 THROMBOPLASTIN TIME PARTIAL: CPT | Performed by: EMERGENCY MEDICINE

## 2017-01-01 PROCEDURE — 99212 OFFICE O/P EST SF 10 MIN: CPT | Performed by: INTERNAL MEDICINE

## 2017-01-01 PROCEDURE — 80048 BASIC METABOLIC PNL TOTAL CA: CPT | Performed by: EMERGENCY MEDICINE

## 2017-01-01 PROCEDURE — 02HV33Z INSERTION OF INFUSION DEVICE INTO SUPERIOR VENA CAVA, PERCUTANEOUS APPROACH: ICD-10-PCS | Performed by: EMERGENCY MEDICINE

## 2017-01-01 PROCEDURE — 85610 PROTHROMBIN TIME: CPT | Performed by: INTERNAL MEDICINE

## 2017-01-01 PROCEDURE — 70470 CT HEAD/BRAIN W/O & W/DYE: CPT | Performed by: NEUROLOGICAL SURGERY

## 2017-01-01 PROCEDURE — 87077 CULTURE AEROBIC IDENTIFY: CPT | Performed by: EMERGENCY MEDICINE

## 2017-01-01 PROCEDURE — 71260 CT THORAX DX C+: CPT | Performed by: INTERNAL MEDICINE

## 2017-01-01 PROCEDURE — 96366 THER/PROPH/DIAG IV INF ADDON: CPT

## 2017-01-01 PROCEDURE — 73610 X-RAY EXAM OF ANKLE: CPT

## 2017-01-01 PROCEDURE — 96375 TX/PRO/DX INJ NEW DRUG ADDON: CPT

## 2017-01-01 PROCEDURE — 96365 THER/PROPH/DIAG IV INF INIT: CPT

## 2017-01-01 PROCEDURE — 85027 COMPLETE CBC AUTOMATED: CPT | Performed by: EMERGENCY MEDICINE

## 2017-01-01 PROCEDURE — 77300 RADIATION THERAPY DOSE PLAN: CPT | Performed by: RADIOLOGY

## 2017-01-01 PROCEDURE — 82805 BLOOD GASES W/O2 SATURATION: CPT | Performed by: EMERGENCY MEDICINE

## 2017-01-01 PROCEDURE — 85007 BL SMEAR W/DIFF WBC COUNT: CPT | Performed by: EMERGENCY MEDICINE

## 2017-01-01 PROCEDURE — 99223 1ST HOSP IP/OBS HIGH 75: CPT | Performed by: OTHER

## 2017-01-01 PROCEDURE — 99239 HOSP IP/OBS DSCHRG MGMT >30: CPT | Performed by: HOSPITALIST

## 2017-01-01 PROCEDURE — 30233R1 TRANSFUSION OF NONAUTOLOGOUS PLATELETS INTO PERIPHERAL VEIN, PERCUTANEOUS APPROACH: ICD-10-PCS | Performed by: HOSPITALIST

## 2017-01-01 PROCEDURE — 92960 CARDIOVERSION ELECTRIC EXT: CPT

## 2017-01-01 PROCEDURE — 00B80ZZ EXCISION OF BASAL GANGLIA, OPEN APPROACH: ICD-10-PCS | Performed by: NEUROLOGICAL SURGERY

## 2017-01-01 PROCEDURE — 87186 SC STD MICRODIL/AGAR DIL: CPT | Performed by: EMERGENCY MEDICINE

## 2017-01-01 PROCEDURE — 70450 CT HEAD/BRAIN W/O DYE: CPT | Performed by: EMERGENCY MEDICINE

## 2017-01-01 PROCEDURE — 99292 CRITICAL CARE ADDL 30 MIN: CPT

## 2017-01-01 PROCEDURE — 70450 CT HEAD/BRAIN W/O DYE: CPT | Performed by: HOSPITALIST

## 2017-01-01 RX ORDER — SODIUM CHLORIDE 9 MG/ML
INJECTION, SOLUTION INTRAVENOUS CONTINUOUS
Status: DISPENSED | OUTPATIENT
Start: 2017-01-01 | End: 2017-01-01

## 2017-01-01 RX ORDER — MAGNESIUM SULFATE HEPTAHYDRATE 40 MG/ML
2 INJECTION, SOLUTION INTRAVENOUS ONCE
Status: COMPLETED | OUTPATIENT
Start: 2017-01-01 | End: 2017-01-01

## 2017-01-01 RX ORDER — CLINDAMYCIN HYDROCHLORIDE 150 MG/1
150 CAPSULE ORAL 3 TIMES DAILY
Qty: 21 CAPSULE | Refills: 0 | Status: SHIPPED | OUTPATIENT
Start: 2017-01-01 | End: 2017-01-01 | Stop reason: ALTCHOICE

## 2017-01-01 RX ORDER — AMOXICILLIN AND CLAVULANATE POTASSIUM 875; 125 MG/1; MG/1
1 TABLET, FILM COATED ORAL EVERY 12 HOURS SCHEDULED
Status: DISCONTINUED | OUTPATIENT
Start: 2017-01-01 | End: 2017-01-01

## 2017-01-01 RX ORDER — 0.9 % SODIUM CHLORIDE 0.9 %
VIAL (ML) INJECTION
Status: COMPLETED
Start: 2017-01-01 | End: 2017-01-01

## 2017-01-01 RX ORDER — AMOXICILLIN AND CLAVULANATE POTASSIUM 875; 125 MG/1; MG/1
1 TABLET, FILM COATED ORAL 2 TIMES DAILY
Qty: 8 TABLET | Refills: 0 | Status: SHIPPED | OUTPATIENT
Start: 2017-01-01 | End: 2017-01-01

## 2017-01-01 RX ORDER — DOXEPIN HYDROCHLORIDE 50 MG/1
1 CAPSULE ORAL DAILY
Qty: 1 TABLET | Refills: 0 | COMMUNITY
Start: 2017-01-01

## 2017-01-01 RX ORDER — SODIUM CHLORIDE 9 MG/ML
INJECTION, SOLUTION INTRAVENOUS ONCE
Status: COMPLETED | OUTPATIENT
Start: 2017-01-01 | End: 2017-01-01

## 2017-01-01 RX ORDER — FAMOTIDINE 10 MG/ML
20 INJECTION, SOLUTION INTRAVENOUS DAILY
Status: DISCONTINUED | OUTPATIENT
Start: 2017-01-01 | End: 2017-01-01

## 2017-01-01 RX ORDER — WARFARIN SODIUM 5 MG/1
5 TABLET ORAL NIGHTLY
Qty: 1 TABLET | Refills: 0 | Status: ON HOLD | COMMUNITY
Start: 2017-01-01 | End: 2017-01-01

## 2017-01-01 RX ORDER — DEXTROSE MONOHYDRATE 25 G/50ML
50 INJECTION, SOLUTION INTRAVENOUS AS NEEDED
Status: DISCONTINUED | OUTPATIENT
Start: 2017-01-01 | End: 2017-01-01

## 2017-01-01 RX ORDER — HALOPERIDOL 5 MG/ML
1 INJECTION INTRAMUSCULAR EVERY 6 HOURS PRN
Status: DISCONTINUED | OUTPATIENT
Start: 2017-01-01 | End: 2017-01-01

## 2017-01-01 RX ORDER — DEXAMETHASONE SODIUM PHOSPHATE 4 MG/ML
10 VIAL (ML) INJECTION ONCE
Status: COMPLETED | OUTPATIENT
Start: 2017-01-01 | End: 2017-01-01

## 2017-01-01 RX ORDER — DILTIAZEM HYDROCHLORIDE 5 MG/ML
10 INJECTION INTRAVENOUS ONCE
Status: COMPLETED | OUTPATIENT
Start: 2017-01-01 | End: 2017-01-01

## 2017-01-01 RX ORDER — POVIDONE-IODINE 10 MG/G
OINTMENT TOPICAL AS NEEDED
Status: DISCONTINUED | OUTPATIENT
Start: 2017-01-01 | End: 2017-01-01 | Stop reason: HOSPADM

## 2017-01-01 RX ORDER — BISACODYL 10 MG
10 SUPPOSITORY, RECTAL RECTAL
Status: DISCONTINUED | OUTPATIENT
Start: 2017-01-01 | End: 2017-01-01

## 2017-01-01 RX ORDER — ONDANSETRON 2 MG/ML
4 INJECTION INTRAMUSCULAR; INTRAVENOUS EVERY 6 HOURS PRN
Status: DISCONTINUED | OUTPATIENT
Start: 2017-01-01 | End: 2017-01-01

## 2017-01-01 RX ORDER — METOPROLOL TARTRATE 5 MG/5ML
2.5 INJECTION INTRAVENOUS AS NEEDED
Status: DISCONTINUED | OUTPATIENT
Start: 2017-01-01 | End: 2017-01-01

## 2017-01-01 RX ORDER — METOPROLOL TARTRATE 100 MG/1
100 TABLET ORAL DAILY
Qty: 90 TABLET | Refills: 3 | Status: SHIPPED | OUTPATIENT
Start: 2017-01-01 | End: 2017-01-01 | Stop reason: CLARIF

## 2017-01-01 RX ORDER — PHENYLEPHRINE HCL 10 MG/ML
VIAL (ML) INJECTION AS NEEDED
Status: DISCONTINUED | OUTPATIENT
Start: 2017-01-01 | End: 2017-01-01 | Stop reason: SURG

## 2017-01-01 RX ORDER — SODIUM CHLORIDE 0.9 % (FLUSH) 0.9 %
3 SYRINGE (ML) INJECTION AS NEEDED
Status: DISCONTINUED | OUTPATIENT
Start: 2017-01-01 | End: 2017-01-01

## 2017-01-01 RX ORDER — DILTIAZEM HYDROCHLORIDE 5 MG/ML
20 INJECTION INTRAVENOUS ONCE
Status: COMPLETED | OUTPATIENT
Start: 2017-01-01 | End: 2017-01-01

## 2017-01-01 RX ORDER — LEVETIRACETAM 500 MG/1
250 TABLET ORAL 2 TIMES DAILY
Status: DISCONTINUED | OUTPATIENT
Start: 2017-01-01 | End: 2017-01-01

## 2017-01-01 RX ORDER — ONDANSETRON 2 MG/ML
INJECTION INTRAMUSCULAR; INTRAVENOUS AS NEEDED
Status: DISCONTINUED | OUTPATIENT
Start: 2017-01-01 | End: 2017-01-01 | Stop reason: SURG

## 2017-01-01 RX ORDER — DEXAMETHASONE 4 MG/1
4 TABLET ORAL EVERY 12 HOURS SCHEDULED
Status: DISCONTINUED | OUTPATIENT
Start: 2017-01-01 | End: 2017-01-01

## 2017-01-01 RX ORDER — METOPROLOL TARTRATE 5 MG/5ML
5 INJECTION INTRAVENOUS
Status: DISCONTINUED | OUTPATIENT
Start: 2017-01-01 | End: 2017-01-01

## 2017-01-01 RX ORDER — CASTOR OIL AND BALSAM, PERU 788; 87 MG/G; MG/G
OINTMENT TOPICAL 2 TIMES DAILY PRN
Status: DISCONTINUED | OUTPATIENT
Start: 2017-01-01 | End: 2017-01-01

## 2017-01-01 RX ORDER — SODIUM CHLORIDE 9 MG/ML
INJECTION, SOLUTION INTRAVENOUS CONTINUOUS PRN
Status: DISCONTINUED | OUTPATIENT
Start: 2017-01-01 | End: 2017-01-01 | Stop reason: SURG

## 2017-01-01 RX ORDER — HALOPERIDOL 5 MG/ML
1 INJECTION INTRAMUSCULAR EVERY 6 HOURS PRN
Qty: 1 SYRINGE | Refills: 0 | Status: SHIPPED | OUTPATIENT
Start: 2017-01-01

## 2017-01-01 RX ORDER — LISINOPRIL 2.5 MG/1
TABLET ORAL
Qty: 90 TABLET | Refills: 3 | Status: SHIPPED | OUTPATIENT
Start: 2017-01-01

## 2017-01-01 RX ORDER — HALOPERIDOL 5 MG/ML
INJECTION INTRAMUSCULAR
Status: COMPLETED
Start: 2017-01-01 | End: 2017-01-01

## 2017-01-01 RX ORDER — ROCURONIUM BROMIDE 10 MG/ML
INJECTION, SOLUTION INTRAVENOUS AS NEEDED
Status: DISCONTINUED | OUTPATIENT
Start: 2017-01-01 | End: 2017-01-01 | Stop reason: SURG

## 2017-01-01 RX ORDER — DEXAMETHASONE SODIUM PHOSPHATE 4 MG/ML
4 VIAL (ML) INJECTION EVERY 12 HOURS
Status: DISCONTINUED | OUTPATIENT
Start: 2017-01-01 | End: 2017-01-01

## 2017-01-01 RX ORDER — 0.9 % SODIUM CHLORIDE 0.9 %
VIAL (ML) INJECTION
Status: DISPENSED
Start: 2017-01-01 | End: 2017-01-01

## 2017-01-01 RX ORDER — BACITRACIN 50000 [USP'U]/1
INJECTION, POWDER, LYOPHILIZED, FOR SOLUTION INTRAMUSCULAR
Status: DISCONTINUED | OUTPATIENT
Start: 1840-12-31 | End: 2017-01-01 | Stop reason: HOSPADM

## 2017-01-01 RX ORDER — DEXAMETHASONE SODIUM PHOSPHATE 4 MG/ML
4 VIAL (ML) INJECTION EVERY 6 HOURS
Status: DISCONTINUED | OUTPATIENT
Start: 2017-01-01 | End: 2017-01-01

## 2017-01-01 RX ORDER — LEVETIRACETAM 250 MG/1
250 TABLET ORAL 2 TIMES DAILY
Qty: 60 TABLET | Refills: 0 | Status: SHIPPED | OUTPATIENT
Start: 2017-01-01

## 2017-01-01 RX ORDER — SODIUM CHLORIDE 9 MG/ML
INJECTION, SOLUTION INTRAVENOUS
Status: DISPENSED
Start: 2017-01-01 | End: 2017-01-01

## 2017-01-01 RX ORDER — NITROGLYCERIN 0.4 MG/1
0.4 TABLET SUBLINGUAL EVERY 5 MIN PRN
Status: DISCONTINUED | OUTPATIENT
Start: 2017-01-01 | End: 2017-01-01

## 2017-01-01 RX ORDER — POTASSIUM CHLORIDE 20 MEQ/1
40 TABLET, EXTENDED RELEASE ORAL ONCE
Status: DISCONTINUED | OUTPATIENT
Start: 2017-01-01 | End: 2017-01-01

## 2017-01-01 RX ORDER — SUCCINYLCHOLINE CHLORIDE 20 MG/ML
INJECTION INTRAMUSCULAR; INTRAVENOUS AS NEEDED
Status: DISCONTINUED | OUTPATIENT
Start: 2017-01-01 | End: 2017-01-01 | Stop reason: SURG

## 2017-01-01 RX ORDER — DILTIAZEM HYDROCHLORIDE 5 MG/ML
INJECTION INTRAVENOUS
Status: COMPLETED
Start: 2017-01-01 | End: 2017-01-01

## 2017-01-01 RX ORDER — LISINOPRIL 2.5 MG/1
2.5 TABLET ORAL DAILY
Status: DISCONTINUED | OUTPATIENT
Start: 2017-01-01 | End: 2017-01-01

## 2017-01-01 RX ORDER — CEFAZOLIN SODIUM 1 G/3ML
INJECTION, POWDER, FOR SOLUTION INTRAMUSCULAR; INTRAVENOUS AS NEEDED
Status: DISCONTINUED | OUTPATIENT
Start: 2017-01-01 | End: 2017-01-01 | Stop reason: SURG

## 2017-01-01 RX ORDER — SENNA AND DOCUSATE SODIUM 50; 8.6 MG/1; MG/1
2 TABLET, FILM COATED ORAL
Status: DISCONTINUED | OUTPATIENT
Start: 2017-01-01 | End: 2017-01-01

## 2017-01-01 RX ORDER — SODIUM CHLORIDE 0.9 % (FLUSH) 0.9 %
10 SYRINGE (ML) INJECTION AS NEEDED
Status: DISCONTINUED | OUTPATIENT
Start: 2017-01-01 | End: 2017-01-01

## 2017-01-01 RX ORDER — METOPROLOL TARTRATE 50 MG/1
50 TABLET, FILM COATED ORAL
Status: DISCONTINUED | OUTPATIENT
Start: 2017-01-01 | End: 2017-01-01

## 2017-01-01 RX ORDER — METOPROLOL SUCCINATE 25 MG/1
12.5 TABLET, EXTENDED RELEASE ORAL
Status: DISCONTINUED | OUTPATIENT
Start: 2017-01-01 | End: 2017-01-01

## 2017-01-01 RX ORDER — LEVETIRACETAM 500 MG/1
500 TABLET ORAL 2 TIMES DAILY
Qty: 60 TABLET | Refills: 0 | Status: SHIPPED | OUTPATIENT
Start: 2017-01-01 | End: 2017-01-01

## 2017-01-01 RX ORDER — LIDOCAINE HYDROCHLORIDE 10 MG/ML
INJECTION, SOLUTION EPIDURAL; INFILTRATION; INTRACAUDAL; PERINEURAL AS NEEDED
Status: DISCONTINUED | OUTPATIENT
Start: 2017-01-01 | End: 2017-01-01 | Stop reason: SURG

## 2017-01-01 RX ORDER — DEXAMETHASONE 4 MG/1
4 TABLET ORAL 2 TIMES DAILY WITH MEALS
Qty: 28 TABLET | Refills: 0 | Status: SHIPPED | OUTPATIENT
Start: 2017-01-01

## 2017-01-01 RX ORDER — MORPHINE SULFATE 4 MG/ML
4 INJECTION, SOLUTION INTRAMUSCULAR; INTRAVENOUS ONCE
Status: COMPLETED | OUTPATIENT
Start: 2017-01-01 | End: 2017-01-01

## 2017-01-01 RX ORDER — METOPROLOL SUCCINATE 25 MG/1
TABLET, EXTENDED RELEASE ORAL
Qty: 45 TABLET | Refills: 3 | Status: SHIPPED | OUTPATIENT
Start: 2017-01-01

## 2017-01-01 RX ORDER — PHYTONADIONE 5 MG/1
5 TABLET ORAL ONCE
Status: DISCONTINUED | OUTPATIENT
Start: 2017-01-01 | End: 2017-01-01

## 2017-01-01 RX ORDER — FAMOTIDINE 20 MG/1
20 TABLET ORAL DAILY
Status: DISCONTINUED | OUTPATIENT
Start: 2017-01-01 | End: 2017-01-01

## 2017-01-01 RX ORDER — METOPROLOL TARTRATE 5 MG/5ML
2.5 INJECTION INTRAVENOUS
Status: DISCONTINUED | OUTPATIENT
Start: 2017-01-01 | End: 2017-01-01

## 2017-01-01 RX ORDER — LEVETIRACETAM 500 MG/1
500 TABLET ORAL 2 TIMES DAILY
Status: DISCONTINUED | OUTPATIENT
Start: 2017-01-01 | End: 2017-01-01

## 2017-01-01 RX ORDER — SODIUM CHLORIDE 9 MG/ML
INJECTION, SOLUTION INTRAVENOUS
Status: COMPLETED
Start: 2017-01-01 | End: 2017-01-01

## 2017-01-01 RX ORDER — DEXAMETHASONE SODIUM PHOSPHATE 4 MG/ML
VIAL (ML) INJECTION AS NEEDED
Status: DISCONTINUED | OUTPATIENT
Start: 2017-01-01 | End: 2017-01-01 | Stop reason: SURG

## 2017-01-01 RX ORDER — NEOSTIGMINE METHYLSULFATE 0.5 MG/ML
INJECTION INTRAVENOUS AS NEEDED
Status: DISCONTINUED | OUTPATIENT
Start: 2017-01-01 | End: 2017-01-01 | Stop reason: SURG

## 2017-01-01 RX ORDER — GLYCOPYRROLATE 0.2 MG/ML
INJECTION INTRAMUSCULAR; INTRAVENOUS AS NEEDED
Status: DISCONTINUED | OUTPATIENT
Start: 2017-01-01 | End: 2017-01-01 | Stop reason: SURG

## 2017-01-01 RX ORDER — METOPROLOL TARTRATE 5 MG/5ML
5 INJECTION INTRAVENOUS AS NEEDED
Status: DISCONTINUED | OUTPATIENT
Start: 2017-01-01 | End: 2017-01-01

## 2017-01-01 RX ORDER — FAMOTIDINE 20 MG/1
20 TABLET ORAL DAILY
Qty: 30 TABLET | Refills: 0 | Status: SHIPPED | OUTPATIENT
Start: 2017-01-01

## 2017-01-01 RX ADMIN — PHENYLEPHRINE HCL 100 MCG: 10 MG/ML VIAL (ML) INJECTION at 13:50:00

## 2017-01-01 RX ADMIN — GLYCOPYRROLATE 0.5 MG: 0.2 INJECTION INTRAMUSCULAR; INTRAVENOUS at 13:55:00

## 2017-01-01 RX ADMIN — ROCURONIUM BROMIDE 30 MG: 10 INJECTION, SOLUTION INTRAVENOUS at 12:26:00

## 2017-01-01 RX ADMIN — SODIUM CHLORIDE: 9 INJECTION, SOLUTION INTRAVENOUS at 12:13:00

## 2017-01-01 RX ADMIN — LIDOCAINE HYDROCHLORIDE 40 MG: 10 INJECTION, SOLUTION EPIDURAL; INFILTRATION; INTRACAUDAL; PERINEURAL at 12:22:00

## 2017-01-01 RX ADMIN — SODIUM CHLORIDE: 9 INJECTION, SOLUTION INTRAVENOUS at 14:00:00

## 2017-01-01 RX ADMIN — SODIUM CHLORIDE: 9 INJECTION, SOLUTION INTRAVENOUS at 12:30:00

## 2017-01-01 RX ADMIN — NEOSTIGMINE METHYLSULFATE 3 MG: 0.5 INJECTION INTRAVENOUS at 13:55:00

## 2017-01-01 RX ADMIN — DEXAMETHASONE SODIUM PHOSPHATE 4 MG: 4 MG/ML VIAL (ML) INJECTION at 12:40:00

## 2017-01-01 RX ADMIN — CEFAZOLIN SODIUM 2 G: 1 INJECTION, POWDER, FOR SOLUTION INTRAMUSCULAR; INTRAVENOUS at 12:33:00

## 2017-01-01 RX ADMIN — SUCCINYLCHOLINE CHLORIDE 80 MG: 20 INJECTION INTRAMUSCULAR; INTRAVENOUS at 12:22:00

## 2017-01-01 RX ADMIN — ONDANSETRON 4 MG: 2 INJECTION INTRAMUSCULAR; INTRAVENOUS at 13:50:00

## 2017-03-01 NOTE — TELEPHONE ENCOUNTER
It is ok for Dr. Maria Ines Perez to see this patient on Monday.      However, as they are on coumadin, I would like them to come in on Friday so we can check the blood (to see how thin it is) and meet with [employer] our pharmacist who will be helping them to understand coum

## 2017-03-01 NOTE — TELEPHONE ENCOUNTER
June Kirby is calling because her dad has a pacemaker & they received a call from his cardiologist office 2 days ago stating that her dad had been in A-fib for 4 hours.  They initially wanted to prescribe Lashae Costain but it was $1,300 for 90 days so they prescribed

## 2017-03-01 NOTE — TELEPHONE ENCOUNTER
Spoke with daughter, Young Bernal, regarding DN message. Scheduled appointment with Donny Alcazar for 2:15 on 3/3/17. Daughter voiced understanding to continue with 5mg coumadin nightly until Friday.

## 2017-03-03 PROBLEM — I48.91 ATRIAL FIBRILLATION, UNSPECIFIED TYPE (HCC): Status: ACTIVE | Noted: 2017-01-01

## 2017-03-03 PROBLEM — I48.91 ATRIAL FIBRILLATION (HCC): Status: ACTIVE | Noted: 2017-01-01

## 2017-03-06 PROBLEM — K04.7 ABSCESSED TOOTH: Status: ACTIVE | Noted: 2017-01-01

## 2017-03-06 NOTE — PROGRESS NOTES
Kyler Reyes is a [de-identified]year old male. HPI:   1. Abscessed tooth -  He has a painful swollen lower right mandible - started about 1 week ago       2.  Atrial fibrillation, unspecified type [facility]) - recently found to be in AF by cardiologist with home monitor headaches    EXAM:   /64 mmHg  Pulse 78  Temp(Src) 98.5 °F (36.9 °C) (Oral)  Resp 18  Ht 5' 8\" (1.727 m)  Wt 162 lb (73.483 kg)  BMI 24.64 kg/m2  SpO2 94%  GENERAL: well developed, well nourished,in no apparent distress  SKIN: no rashes,no suspiciou

## 2017-03-20 NOTE — PROGRESS NOTES
HPI:    Patient ID: Lani Nuno is a [de-identified]year old male. HPI      Routine Foot & Toenail Care  Patient has arrived for routine foot and toenail care. This problem is chronic and has been occuring for several years. No new complaints or problems.  Denies Oral Tab Take 1 tablet (12.5 mg total) by mouth daily. Disp: 90 tablet Rfl: 3   lisinopril 2.5 MG Oral Tab Take 1 tablet (2.5 mg total) by mouth every morning.  Disp: 90 tablet Rfl: 3   Metoprolol Succinate ER 25 MG Oral Tablet 24 Hr Take 0.5 tablets (12.5 given in office. Return in 2 months for routine care.            Meds This Visit:  No prescriptions requested or ordered in this encounter    Imaging & Referrals:  None       ID#[id number]      By signing my name below, I, Tracey Harper,  attest that this documen

## 2017-05-10 NOTE — PROGRESS NOTES
Sonal Flores is a [de-identified]year old male. HPI:   He looks and feels much better. No falls, incontinence gone since  shunt placed about 1 yr ago. Good appetite.  He spends most of the day in his chair and dozes off and on all day and then has difficulty sleep well otherwise  SKIN: denies any unusual skin lesions or rashes  RESPIRATORY: denies shortness of breath with exertion  CARDIOVASCULAR: denies chest pain on exertion  GI: denies abdominal pain and denies heartburn  NEURO: denies headaches    EXAM:

## 2017-07-05 PROBLEM — R79.89 ELEVATED TROPONIN: Status: ACTIVE | Noted: 2017-01-01

## 2017-07-05 PROBLEM — R77.8 ELEVATED TROPONIN: Status: ACTIVE | Noted: 2017-01-01

## 2017-07-05 PROBLEM — I49.5 SICK SINUS SYNDROME (HCC): Chronic | Status: ACTIVE | Noted: 2017-01-01

## 2017-07-05 PROBLEM — G93.89 INTRACEREBRAL MASS: Status: ACTIVE | Noted: 2017-01-01

## 2017-07-05 PROBLEM — R41.82 ALTERED MENTAL STATUS, UNSPECIFIED ALTERED MENTAL STATUS TYPE: Status: ACTIVE | Noted: 2017-01-01

## 2017-07-05 NOTE — TELEPHONE ENCOUNTER
Patient fell over the weekend - was not take to ER, as he appeared to be okay. Yesterday his right foot & leg swelled & now he can not walk or stand on his own. Patient has cardio vascular issues, so daughter is not sure if that is related.     Daughter

## 2017-07-05 NOTE — TELEPHONE ENCOUNTER
Per patients daughter, patient fell two days ago and was \"pretty shaken\" by the fall but at that time did not complain of any pain or discomfort.  At time of fall both of patients legs were swollen because patient was taken off of the diuretics by

## 2017-07-05 NOTE — ED INITIAL ASSESSMENT (HPI)
Pt had fall on Monday, 7/3/2017, from home with caregiver, found on floor, unknown LOC/ hitting head, new onset slurred speech, worsening manual dexterity. Pt on coumadin.  Daughter reports lower leg swelling, \"not acting like himself,\" \"not able to use

## 2017-07-06 NOTE — CONSULTS
[facility]    Report of Consultation    Don Naik Patient Status:  Inpatient    1935 MRN [id number]   Location [facility] 2W/SW Attending Sade Garrido MD   [facility] Day # 1 PCP Digna Knapp MD     Date of Admis (LOPRESSOR) 5 MG/5ML injection 2.5 mg 2.5 mg Intravenous TID Beta Blocker/Cardiac   Or      Metoprolol Tartrate (LOPRESSOR) tab 50 mg 50 mg Oral TID Beta Blocker/Cardiac   Or      metoprolol Tartrate (LOPRESSOR) tab 25 mg 25 mg Oral TID Beta Blocker/Cardia 6\"), weight 148 lb 3.2 oz (67.2 kg), SpO2 96 %. He is awake and alert and resting comfortably. He is CHIN x 1-2 (aware last name, believes it is August 2017, believes he is at [employer], unaware situation or recent falls). The pupils are PERRL.   EOMs ar Impression:     Intracerebral mass    Mr. 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 Has has recent falls and worsening mental status over the previous few days. A CT scan shows a left basal ganglia hemorrhagic mass with enhancement.   We re

## 2017-07-06 NOTE — ED PROVIDER NOTES
Patient Seen in: [facility] AND [facility] Emergency Department    History   Patient presents with:  Fall (musculoskeletal, neurologic)    Stated Complaint:  fall    HPI    [de-identified] yo M with PMH HTN, afib on warfarin, NPH s/p VPS placement approximately one year ago pr CONDITION  Constitutional: As per HPI    Positive for stated complaint:   Other systems are as noted in HPI. Constitutional and vital signs reviewed. All other systems reviewed and negative except as noted above.     PSFH elements reviewed from today Triglycerides 192 (*)     All other components within normal limits   BASIC METABOLIC PANEL (8) - Abnormal; Notable for the following:     Glucose 126 (*)     GFR, Non- 52 (*)     All other components within normal limits   CK CREATINE K created for panel order ED/MRSA SCREEN BY PCR-CC.   Procedure                               Abnormality         Status                     ---------                               -----------         ------                     ED/MRSA SCREEN BY IGOR[[id number] Medication List

## 2017-07-06 NOTE — PROGRESS NOTES
[facility] HOSP - [facility]    Cardiology Progress Note    Ricardo Trayangel Patient Status:  Inpatient    1935 MRN [id number]   Location [facility] 2W/SW Attending Dahiana Levin MD   Hosp Day # 1 PCP Melissa Knapp MD     2017  Subj 07/05/17   2124   TROP  0.05*  0.03       Allergies:   No Known Allergies    Medications:    Current Facility-Administered Medications:  balsam peru-castor oil (VENELEX) ointment  Topical BID PRN   metoprolol Tartrate (LOPRESSOR) 5 MG/5ML injection 5 mg 5 [I48.91]     Atrial fibrillation, unspecified type ([facility]     Abscessed tooth     Intracerebral mass     Altered mental status, unspecified altered mental status type     Elevated troponin     [facility]      Plan:  1.  Elevated troponin  - AC

## 2017-07-06 NOTE — ED NOTES
Spoke with Dr. Joanna Knowles in the ER. CT of head images reviewed on the computer. Recommended Decadron 10 mg IV push. Would recommend continuing Decadron 4 mg IV every 6 hours. Neurosurgery has been consulted. Will order MRI of the brain.   Will evaluate pa

## 2017-07-06 NOTE — CONSULTS
[facility]    PATIENT'S NAME: Rikki Kezia   ATTENDING PHYSICIAN: Osmin Callaway MD   CONSULTING PHYSICIAN: Nano Cortes MD   PATIENT ACCOUNT#:   [id number]    LOCATION:  [address] [place] [address] RECORD #:   [id number]       [facility] tell me first and last name, his age, date of birth, he knows he is in the hospital but not the name of the hospital.  He could not tell me the year, month, day of the month, day of the week, president's name. Good attention and concentration.   Short-term

## 2017-07-06 NOTE — PLAN OF CARE
Problem: NEUROLOGICAL - ADULT  Goal: Achieves stable or improved neurological status  INTERVENTIONS  - Assess for and report changes in neurological status  - Initiate measures to prevent increased intracranial pressure  - Maintain blood pressure and fluid stability  INTERVENTIONS:  - Monitor vital signs, rhythm, and trends  - Monitor for bleeding, hypotension and signs of decreased cardiac output  - Evaluate effectiveness of vasoactive medications to optimize hemodynamic stability  - Monitor arterial and/or function  - Maintain adequate hydration with IV or PO as ordered and tolerated  - Evaluate effectiveness of GI medications  - Encourage mobilization and activity  - Obtain nutritional consult as needed  - Establish a toileting routine/schedule  - Consider nutrition restrictions as appropriate  Outcome: Not Progressing  Awaiting bmp results from this am. Pt on electrolyte protocol.    Goal: Hemodynamic stability and optimal renal function maintained  INTERVENTIONS:  - Monitor labs and assess for signs and sym ordered. Problem: SAFETY ADULT - FALL  Goal: Free from fall injury  INTERVENTIONS:  - Assess pt frequently for physical needs  - Identify cognitive and physical deficits and behaviors that affect risk of falls.   - [place] fall precautions as indicate

## 2017-07-06 NOTE — SLP NOTE
SPEECH/LANGUAGE/COGNITIVE EVALUATION - INPATIENT    Admission Date: 7/5/2017  Evaluation Date: 07/06/17    Reason for Referral: R/O aspiration;Stroke protocol    ASSESSMENT & PLAN   ASSESSMENT  Pt seen sitting upright at EOB for speech/swallow evaluation. 100 % accuracy within mild cues within 3 session(s).    Goal #5 The patient will name 5 items given category over 30 second intervals given mild cues over 3 sessions     Prior Living Situation: Home with support    Prior Level of Function: Assistance/Suppor

## 2017-07-06 NOTE — CONSULTS
[facility]  Report of Consultation    Atiya Albright Patient Status:  Inpatient    1935 MRN [id number]   Location [facility] 2W/SW Attending Fredy Lua MD   Hosp Day # 0 PCP Rip Kelly.  MD Aria     Reason for Consultation injection 20 mg, 20 mg, Intravenous, Daily  •  Atropine Sulfate 0.1 MG/ML injection 0.5 mg, 0.5 mg, Intravenous, PRN  •  nitroGLYCERIN (NITROSTAT) SL tab 0.4 mg, 0.4 mg, Sublingual, Q5 Min PRN  •  ondansetron HCl (ZOFRAN) injection 4 mg, 4 mg, Intravenous, 0. 03.    Assessment/Plan:  Patient Active Problem List:     H/O abdominal aortic aneurysm repair     Hypertension     Hyperlipidemia     Urinary incontinence     Constipation     Weakness of both legs     Lumbar pain     Imbalance     Fall     Weakness

## 2017-07-06 NOTE — PROGRESS NOTES
Troy Keagan  : 1935  ACCOUNT:  [id number]  [phone]  PCP: Dr. Mendez Sanchez    TODAY'S DATE: 03/10/2017  DICTATED BY:  Emily Cheung M.D.]    CHIEF COMPLAINT: [Followup of Atrial fibrillation, Followup of CAD, of native vessels and Followup of H 108/66 , Pulse - 78, Respiration - 18, Weight -  162, Height -   68 , BMI - 24.6 ]    CONS: well nourished, comfortable and overweight. WEIGHT: BMI parameters reviewed and discussed. HEAD/FACE: no trauma and normocephalic.  EYES: conjunctivae not injected a 12.5MG    daily  06/29/16 Lisinopril            2.5MG     daily  06/29/16 Metoprolol Succinate E25MG      half a tablet daily      Coral Bailey M.D.    NADIYA/chay - DD: 03/10/2017 - DT: 03/15/2017 - Job ID: [id number]   C: Dr. Pao Nugent

## 2017-07-06 NOTE — PHYSICAL THERAPY NOTE
PHYSICAL THERAPY EVALUATION - INPATIENT     Room Number: 215/215-A  Evaluation Date: 7/6/2017  Type of Evaluation: Initial  Physician Order: PT Eval and Treat    Presenting Problem: intracranial mass  Reason for Therapy: Mobility Dysfunction and Discha Fair  Frequency (Obs): 5x/week       PHYSICAL THERAPY MEDICAL/SOCIAL HISTORY     History related to current admission: The pt presented to the hospital after a fall with AMS.   In the emergency room, a CT scan of the brain unfortunately revealed a 4 cm intr limits    BALANCE  Static Sitting: Fair  Dynamic Sitting: Fair  Static Standing: Poor  Dynamic Standing: Poor -    ADDITIONAL TESTS  Additional Tests: None          ACTIVITY TOLERANCE  O2 Saturation: 97%  Room air  Heart Rate: 71  Blood Pressure: 110/59 Patient is able to demonstrate supine - sit EOB @ level: minimum assistance     Goal #1   Current Status    Goal #2 Patient is able to demonstrate transfers Sit to/from Stand at assistance level: minimum assistance with walker - rolling     Goal #2  Royal

## 2017-07-06 NOTE — H&P
[facility]    PATIENT'S NAME: Makayla Angel   ATTENDING PHYSICIAN: Abbie Lopez MD   PATIENT ACCOUNT#:   [id number]    LOCATION:  [address] [place] [address] RECORD #:   [id number]       YOB: 1935  ADMISSION DATE:       07/ repair; BPH; normal pressure hydrocephalus, status post  shunt placement; hypertension; status post pacemaker placement; onychomycosis; hypercholesterolemia; PVD with reduced capillary refill sign. ALLERGIES:  No known drug allergies.     MEDICATIONS: Unable to follow any significant, even minimal, commands. PSYCHIATRIC:  Not agitated. BACK:  No apparent costovertebral angle tenderness noted.     LABORATORY DATA:  Glucose was 126, sodium 139, potassium 3.8, chloride 100, CO2 27, BUN of 20 with a creati venous thrombosis prophylaxis. Will place on sequential compression devices. Will not use anticoagulation given the brain tumor. 7.   Question whether a biopsy will be entertained given the patient's age and underlying condition.   However, apparently he

## 2017-07-06 NOTE — ED NOTES
Received pt via wheelchair. Pt is A&Ox2. Per Family, \"Pt fell on Monday\". Unwitnessed fall. Unknown if hit head or loc. Pt's speech is clear; slow to respond. Pt is [place]. Pt reports right ankle pain. Pt denies cp, or sob.  Pupils are equal, round, and reac

## 2017-07-06 NOTE — SLP NOTE
ADULT SWALLOWING EVALUATION    ASSESSMENT & PLAN   ASSESSMENT  Pt seen sitting upright at EOB for all PO trials. No anterior loss of bolus on any consistency given. Bolus formation and a-p propulsion appeared functional and timely.  Mastication of hard kathrine known hx of dysphagia at [facility]. Imaging Results: CXR on 7/5 was negative. OBJECTIVE   ORAL MOTOR EXAMINATION  Dentition: Ill fitting  Symmetry: Within Functional Limits  Strength:  Within Functional Limits  Tone: Within Functional Limits  Range of Motion Meet Established Goals: 3  Follow Up Needed: Yes  SLP Follow-up Date: 07/07/17    Thank you for your referral.   Manisha Salter MA, [address] Pathologist  [place]. [phone]    If you have any questions, please contac

## 2017-07-06 NOTE — BH PROGRESS NOTE
ADMISSION NOTE    [de-identified]year old male with h/o NPH s/p shunt, AAA repair HTN presents after an unwitnessed fall with altered mental status CT revealed mass in basal ganglia with large amount of swelling.   ED MD spoke with Dr. Inge Manzo and Dr. Clare Carnes pt started o

## 2017-07-06 NOTE — DISCHARGE PLANNING
MOSES following up on d/c planning for the patient. MOSES met with him at bedside today and he live with his wife and 2 adult children. He does not use a cane or walker at home and has been independent with his ADLs.   Patient indicated that he has some h/o suze

## 2017-07-06 NOTE — CONSULTS
Consult dictated–patient examined, CT of the head, CT with contrast of the head reviewed. 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 Concur with obtaining tissue for diagnosis, treatment options.   Would recommend on

## 2017-07-07 PROBLEM — R41.82 ALTERED MENTAL STATUS: Status: ACTIVE | Noted: 2017-01-01

## 2017-07-07 PROBLEM — Z71.89 GOALS OF CARE, COUNSELING/DISCUSSION: Status: ACTIVE | Noted: 2017-01-01

## 2017-07-07 NOTE — PROGRESS NOTES
Mr. Courtney Reese is awake and alert. He is primarily focused on trying to remove his pulse ox. He is oriented to person and answers simple questions. Afebrile, vital signs stable.   Mr. Courtney Reese follows commands intermittently but does have good strength with al

## 2017-07-07 NOTE — CONSULTS
[facility] Patient Status:  Inpatient    1935 MRN [id number]   Location [facility] 2W/SW Attending Justo Sutherland MD   Hosp Day # 2 PCP Andreas Guerra.  MD Aria     Date of C about 16 pounds in the past 4 months per [facility] documentation and dtr concurs. Denies any abdominal pain, n/v and moved his bowels today. No signs of anxiety or agitation and took 1 dose of Haldol prn overnight night.  He is currently bedbound, but was fairly We discussed potential for rehab upon dc and told her SW would follow up on this. I recommended community palliative care upon dc and family in agreement. I discussed the importance of advance care planning prior to crisis with pt's dtr Larry Bianchi.  I addressed IVPB, 40 mEq, Intravenous, Once  •  balsam peru-castor oil (VENELEX) ointment, , Topical, BID PRN  •  metoprolol Tartrate (LOPRESSOR) 5 MG/5ML injection 5 mg, 5 mg, Intravenous, TID Beta Blocker/Cardiac **OR** metoprolol Tartrate (LOPRESSOR) 5 MG/5ML injec 40.0 (L) 07/07/2017    07/07/2017       Coags:  Lab Results  Component Value Date   PT 13.7 07/12/2016   INR 2.4 (H) 07/07/2017   PTT 31.5 07/12/2016       Chemistry:  Lab Results  Component Value Date   CREATSERUM 1.13 07/07/2017   BUN 32 (H) 07/07 Portable  (cpt=71010)    Result Date: 7/6/2017  CONCLUSION:  1. Atherosclerosis. 2. Normal heart and lungs. 3. Demineralization. 4. Scoliosis. 5. Osteoarthritis. 6. Bilateral rotator cuff tears. 7. Pacemaker. 8.  shunt.  9. A preliminary report was submit mental status type  -R/t #1    H/o Afib  -Followed by cardiology    Hypertension    Hyperlipidemia    SSS s/p Pacemaker    Elevated troponin    Goals of care, counseling/discussion  -See HPI above for full details.  -Pt is full code and family not ready to

## 2017-07-07 NOTE — PROGRESS NOTES
He relates no new neurological problems. He denies headache. He does have some frontal lobe personality changes. Trouble with attention, concentration. Dr. Taran Drew note reviewed. Greatly appreciate his assistance.     Persistent slight–moderate right

## 2017-07-07 NOTE — PHYSICAL THERAPY NOTE
PHYSICAL THERAPY TREATMENT NOTE - INPATIENT    Room Number: 215/215-A       Presenting Problem: intracranial mass    Problem List  Principal Problem:    Intracerebral mass  Active Problems:    Hypertension    Hyperlipidemia    Pacemaker    Altered mental Static Sitting: Fair  Dynamic Sitting: Fair           Static Standing: Poor +  Dynamic Standing: Poor +      AM-PAC '6-Clicks' INPATIENT SHORT FORM - BASIC MOBILITY  How much difficulty does the patient currently h device: walker - rolling at assistance level: minimum assistance   Goal #3   Current Status Mod A x 2 for attempted ambulation for 3' x 2 attempts.    Goal #4 Patient to demonstrate independence with home activity/exercise instructions provided to patient i

## 2017-07-07 NOTE — PLAN OF CARE
Problem: SKIN/TISSUE INTEGRITY - ADULT  Goal: Skin integrity remains intact  INTERVENTIONS  - Assess and document risk factors for pressure ulcer development  - Assess and document skin integrity  - Monitor for areas of redness and/or skin breakdown  - Ini self-harm  INTERVENTIONS:  - Apply the least restrictive restraint to prevent harm  - Notify patient and family of reasons restraints applied  - Assess for any contributing factors to confusion (electrolyte disturbances, delirium, medications)  - Discontin

## 2017-07-07 NOTE — PROGRESS NOTES
07/06/17 1958   Clinical Encounter Type   Visited With Patient   Routine Visit Introduction   Continue Visiting Yes   Crisis Visit Critical care   Referral From Nurse   Referral To    Patient Spiritual Encounters   Spiritual Needs Empathic liste

## 2017-07-07 NOTE — DISCHARGE PLANNING
SW following up on d/c planning for the patient. Therapy evaluated the patient and are recommending rehab at this time. Palliative care is also on consult and will be meeting with the family today.   SNF list left in the room and SW will follow-up with fa

## 2017-07-07 NOTE — PLAN OF CARE
Problem: NEUROLOGICAL - ADULT  Goal: Achieves stable or improved neurological status  INTERVENTIONS  - Assess for and report changes in neurological status  - Initiate measures to prevent increased intracranial pressure  - Maintain blood pressure and fluid cardiac output  - Evaluate effectiveness of vasoactive medications to optimize hemodynamic stability  - Monitor arterial and/or venous puncture sites for bleeding and/or hematoma  - Assess quality of pulses, skin color and temperature  - Assess for signs o mobilization and activity  - Obtain nutritional consult as needed  - Establish a toileting routine/schedule  - Consider collaborating with pharmacy to review patient's medication profile   Outcome: Progressing  Pt with soft bm last night.    Goal: Maintains

## 2017-07-07 NOTE — PLAN OF CARE
Problem: Patient Centered Care  Goal: Patient preferences are identified and integrated in the patient's plan of care  Interventions:  - What would you like us to know as we care for you? Lives with wife, two of four children live with them.   - Provide ti activity  INTERVENTIONS:  - Maintain airway, patient safety  and administer oxygen as ordered  - Monitor patient for seizure activity, document and report duration and description of seizure to MD/LIP  - If seizure occurs, turn patient to side and suction arrhythmias  - Monitor electrolytes and administer replacement therapy as ordered   Outcome: Not Progressing  Patient with frequent ectopy.  MD aware, Electrolytes WNL    Problem: RESPIRATORY - ADULT  Goal: Achieves optimal ventilation and oxygenation  INTE with thin liquids ordered, patient fed self.     Problem: METABOLIC/FLUID AND ELECTROLYTES - ADULT  Goal: Glucose maintained within prescribed range  INTERVENTIONS:  - Monitor Blood Glucose as ordered  - Assess for signs and symptoms of hyperglycemia and hy environment to reduce risk of injury  - Provide assistive devices as appropriate  - Consider OT/PT consult to assist with strengthening/mobility  - Encourage toileting schedule   Outcome: Progressing

## 2017-07-07 NOTE — PROGRESS NOTES
[facility]    Cardiology Progress Note    Natalie Jiang Patient Status:  Inpatient    1935 MRN [id number]   Location [facility] 2W/SW Attending Justo Sutherland MD   Hosp Day # 2 PCP Andreas Guerra.  MD Aria     2017  Subj AST  77*  58*   ALB  3.5  3.2*       Recent Labs   Lab  07/05/17   1922  07/05/17   2124   TROP  0.05*  0.03       Allergies:   No Known Allergies    Medications:    Current Facility-Administered Medications:  Magnesium Sulfate IVPB premix SOLN 2 g 2 g I [facility] injection 4 mg 4 mg Intravenous Q6H PRN         Assessment:  Patient Active Problem List:     H/O abdominal aortic aneurysm repair     Hypertension     Hyperlipidemia     Urinary incontinence     Constipation     Weakness of both legs     Lumbar p

## 2017-07-07 NOTE — PROGRESS NOTES
[facility]    Progress Note    Vito Jacobs Patient Status:  Inpatient    1935 MRN [id number]   Location [facility] 2W/SW Attending Michael Cadena MD   Hosp Day # 2 PCP Brad Estes.  MD Aria       Subjective:   Wilmer Murillo Beta Blocker/Cardiac   • levETIRAcetam  500 mg Intravenous Q12H   • insulin aspart  1-5 Units Subcutaneous TID CC   • dexamethasone Sodium Phosphate  4 mg Intravenous Q6H   • famoTIDine  20 mg Oral Daily    Or   • famoTIDine  20 mg Intravenous Daily (H) 07/06/2017   INR 1.6 (H) 07/05/2017     Imaging/EKG:   Xr Ankle (min 3 Views), Right (cpt=73610)    Result Date: 7/5/2017  CONCLUSION: Soft tissue swelling without acute fracture or dislocation.          Xr Pelvis (1 View) (cpt=72170)    Result Date: 7/ on 07/06/2017 at 10:06 by Shelia Hooks MD    Ekg 12-lead    Result Date: 7/5/2017  ECG Report  Interpretation  -------------------------- Electronic ventricular pacemaker -possibly demand type PVCS When compared with ECG of 02/01/2016 14:29:58 RATE INCREASED

## 2017-07-07 NOTE — SLP NOTE
SPEECH DAILY NOTE - INPATIENT    Evaluation Date: 07/06/17    ASSESSMENT & PLAN   ASSESSMENT  Pt seen sitting upright in bed for speech/swallow tx session. Pt with family present during session.  Per family report, pt with hearing loss and ill-fitting d of aspiration with 100 % accuracy over 2 session(s). GOAL NOT ADDRESSED. Pt's dentures remain loose and intermittently falling from oral cavity.      Goal #4 The patient will utilize compensatory strategies as outlined by  BSSE (clinical evaluation) incl Follow-up Date: 07/08/17  Number of Visits to Meet Established Goals: 3  Session: 1 following evaluation.     Everardo Salinas MA, [address] Pathologist  [place]. [phone]  If you have any questions, please contact Benson Dixon

## 2017-07-08 NOTE — PROGRESS NOTES
[facility]    Progress Note    Kiet Salmon Patient Status:  Inpatient    1935 MRN [id number]   Location [facility] 2W/SW Attending Patito Sargent MD   Hosp Day # 3 PCP [address]  MD Aria       Subjective:   Juanpablo Fine Or   • metoprolol tartrate  50 mg Oral TID Beta Blocker/Cardiac    Or   • metoprolol tartrate  25 mg Oral TID Beta Blocker/Cardiac   • levETIRAcetam  500 mg Intravenous Q12H   • insulin aspart  1-5 Units Subcutaneous TID CC   • dexamethasone Sodium Phos 63  56   --    --    AST  77*  58*   --    --    ALT  39  36   --    --    BILT  1.7*  1.4*   --    --    TP  7.3  6.4   --    --        Lab Results  Component Value Date   PT 13.7 07/12/2016   INR 2.4 (H) 07/07/2017   INR 1.7 (H) 07/06/2017   INR 1.6 (H)

## 2017-07-08 NOTE — SLP NOTE
Speech visit. Attempted to see patient for dysphagia follow up / diet tolerance. RN reports that patient refused lunch, but ate breakfast well.   Spoke with PCT who assisted patient with breakfast and reported a good appetite and no coughing or throat tristan

## 2017-07-08 NOTE — PROGRESS NOTES
Neurology Inpatient Follow-up Note      HPI:     Interval history, notes, and imaging/blood work reviewed. Family is at bedside. Patient offers no complaints.       Past Medical Hisotory:  Reviewed    Medications:  Reviewed    Allergies:  No Known Allergi

## 2017-07-08 NOTE — PLAN OF CARE
GASTROINTESTINAL - ADULT    • Maintains or returns to baseline bowel function Not Progressing    • Maintains adequate nutritional intake (undernourished) Not Progressing        Impaired Activities of Daily Living    • Achieve highest/safest level of indepe is appropriate with restraints in place. Skin intact except for his coccyx, which is excoriated and red. Venelex cream applied to the area and mepilex in place. Blood pressure is stable. Patient is currently following commands. Oriented to person only.  All

## 2017-07-08 NOTE — SPIRITUAL CARE NOTE
SD    When Chp. Went in Pt. was asleep and while Chp. Was writing a  'missed you card' he woke up and so Chp. tried talking to him and all he wanted was his glasses and Chp. looked around for them but could not find them.  Informed RN and RN said that he is

## 2017-07-09 NOTE — PROGRESS NOTES
[facility]    Neurosurgery Progress Note    Ricardo Trayangel Patient Status:  Inpatient    1935 MRN [id number]   Location [facility] 2W/SW Attending Dahiana Levin MD   Hosp Day # 4 PCP Melissa Wilkinson.  MD Aria     Subjective Q6H PRN  •  dexamethasone Sodium Phosphate (DECADRON) 4 MG/ML injection 4 mg, 4 mg, Intravenous, Q6H  •  Normal Saline Flush 0.9 % injection 3 mL, 3 mL, Intravenous, PRN  •  famoTIDine (PEPCID) tab 20 mg, 20 mg, Oral, Daily **OR** famoTIDine (PEPCID) injec

## 2017-07-09 NOTE — PLAN OF CARE
CARDIOVASCULAR - ADULT    • Maintains optimal cardiac output and hemodynamic stability Progressing    • Absence of cardiac arrhythmias or at baseline Progressing    Vs remain stable, on betablocker protocol    GASTROINTESTINAL - ADULT    • Maintains or ret to pull iv, tele monitoring    SKIN/TISSUE INTEGRITY - ADULT    • Skin integrity remains intact Progressing    • Oral mucous membranes remain intact Progressing    Buttock excoriated. vasolex applied.       Maintains optimal cardiac output and hemodynamic s

## 2017-07-09 NOTE — CONSULTS
[facility]    PATIENT'S NAME: Brandon Bean   ATTENDING PHYSICIAN: Jose David Fritz MD   CONSULTING PHYSICIAN: Mikayla Victoria.  MD Adilson   PATIENT ACCOUNT#:   [id number]    LOCATION:  [address] [place] [address] #:   [id number]       DATE OF BIRTH: Former smoking history, quitting in 2013, but prior to that a 40-pack-year history. Occasional alcohol use. No illicit drug use. Lives at home. FAMILY HISTORY:  Unable to be obtained, due to clinical condition.     REVIEW OF SYSTEMS:  Unable to be o of this delightful patient. We will continue to follow during inpatient stay. Dictated By Neva Sims MD  d: 07/09/2017 16:31:47  t: 07/09/2017 16:48:30  [facility] [id number]  [facility]/    cc: MD Jina Hi MD

## 2017-07-09 NOTE — PLAN OF CARE
Problem: NEUROLOGICAL - ADULT  Goal: Achieves stable or improved neurological status  INTERVENTIONS  - Assess for and report changes in neurological status  - Initiate measures to prevent increased intracranial pressure  - Maintain blood pressure and fluid arterial and/or venous puncture sites for bleeding and/or hematoma  - Assess quality of pulses, skin color and temperature  - Assess for signs of decreased coronary artery perfusion - ex.  Angina  - Evaluate fluid balance, assess for edema, trend weights strengthening/mobility  - Encourage toileting schedule   Outcome: Not Progressing      Problem: Safety Risk - Non-Violent Restraints  Goal: Patient will remain free from self-harm  INTERVENTIONS:  - Apply the least restrictive restraint to prevent harm  -

## 2017-07-09 NOTE — SLP NOTE
SPEECH DAILY NOTE - INPATIENT    Evaluation Date: 07/09/17    ASSESSMENT & PLAN   ASSESSMENT    Pt seen upright in bed with breakfast tray. PCT feeding pt at time of entrance. Straw observed in cup of thin liquids.  Pt with overt s/s aspiration (i.e., refle precautions and swallow strategies independently over 3 session(s).     SLP provided verbal education of all swallow goals with patient and RN. RN verbalized understanding &  agreeable to SLP POC and diet recommendations/strategies.     GOAL PROGRESSING. GOAL NOT MET. Pt did not verbalize/vocalize at all during this session. Pt appeared disinterested. Goal #4 The patient will comprehend paragraph information as per [facility] questions with 100 % accuracy within mild cues within 3 session(s).   GOAL NOT ADDRES

## 2017-07-09 NOTE — PROGRESS NOTES
[facility]    Progress Note    [facility] Patient Status:  Inpatient    1935 MRN [id number]   Location [facility] 2W/SW Attending Lia Crespo MD   Hosp Day # 4 PCP Jeannie Rodriguez.  MD Aria       Subjective:   Valentina Werner Tartrate  5 mg Intravenous TID Beta Blocker/Cardiac    Or   • metoprolol Tartrate  2.5 mg Intravenous TID Beta Blocker/Cardiac    Or   • metoprolol tartrate  50 mg Oral TID Beta Blocker/Cardiac    Or   • metoprolol tartrate  25 mg Oral TID Beta Blocker/Car Value Date   PT 13.7 07/12/2016   INR 2.1 (H) 07/09/2017   INR 2.3 (H) 07/08/2017   INR 2.4 (H) 07/07/2017     Imaging/EKG:   Ct Brain (w+wo) (bqc=[id number])    Result Date: 7/7/2017  CONCLUSION: Preoperative localization protocol with stable enhancing hemorrh

## 2017-07-10 PROBLEM — Z71.89 ADVANCE CARE PLANNING: Status: ACTIVE | Noted: 2017-01-01

## 2017-07-10 NOTE — PLAN OF CARE
Problem: NEUROLOGICAL - ADULT  Goal: Achieves stable or improved neurological status  INTERVENTIONS  - Assess for and report changes in neurological status  - Initiate measures to prevent increased intracranial pressure  - Maintain blood pressure and fluid arterial and/or venous puncture sites for bleeding and/or hematoma  - Assess quality of pulses, skin color and temperature  - Assess for signs of decreased coronary artery perfusion - ex.  Angina  - Evaluate fluid balance, assess for edema, trend weights precautions as indicated by assessment.  - Educate pt/family on patient safety including physical limitations  - Instruct pt to call for assistance with activity based on assessment  - Modify environment to reduce risk of injury  - Provide assistive device

## 2017-07-10 NOTE — PLAN OF CARE
Problem: Patient/Family Goals  Goal: Patient/Family Long Term Goal  Patient's Long Term Goal: to be discharged from the hospital  Interventions:  - Neuro checks  - See additional Care Plan goals for specific interventions    Outcome: Not Progressing  Not r nutritional intake (undernourished)  INTERVENTIONS:  - Monitor percentage of each meal consumed  - Identify factors contributing to decreased intake, treat as appropriate  - Assist with meals as needed  - Monitor I&O, WT and lab values  - Obtain nutritiona assist with strengthening/mobility  - Encourage toileting schedule   Outcome: Progressing  Bed in lowest/locked position, frequent safety checks. Call light within reach but pt does not seem to understand how to use. Hourly rounding.     Problem: Safety R

## 2017-07-10 NOTE — OCCUPATIONAL THERAPY NOTE
Attempted to see for OT treatment. Spoke with RN who stated that pt is going to OR for craniotomy. Will need new orders post-op to continue OT when appropriate.      Frederick Pastor MA, OTR/L  Occupational Therapist

## 2017-07-10 NOTE — PROGRESS NOTES
[facility]    Progress Note    Gautam Simmons Patient Status:  Inpatient    1935 MRN [id number]   Location [facility] 2W/SW Attending Roberto Koroma MD   Hosp Day # 5 PCP Ryan Higgins.  MD Aria       Assessment and Plan: • metoprolol Tartrate  2.5 mg Intravenous TID Beta Blocker/Cardiac    Or   • metoprolol tartrate  50 mg Oral TID Beta Blocker/Cardiac    Or   • metoprolol tartrate  25 mg Oral TID Beta Blocker/Cardiac   • levETIRAcetam  500 mg Intravenous Q12H   • jacquie MOISES Jeffers  7/10/2017    Seen and examined bedside. Agree with the present management, will follow with you. Call if any questions thank you. Luiz Hagen MD  [facility]. Interventional Cardiology.   [phone]

## 2017-07-10 NOTE — PROGRESS NOTES
Neurology Inpatient Follow-up Note      HPI:     For biopsy today. Family at bedside.       Past Medical Hisotory:  Reviewed    Medications:  Reviewed    Allergies:  No Known Allergies      ROS:   Unable to obtain comprehensive review of systems from hamida

## 2017-07-10 NOTE — ANESTHESIA PROCEDURE NOTES
Arterial Line  Performed by: Yennifer Danielson   Authorized by: Clemente Flood     Procedure Start:  7/10/2017 12:33 PM  Procedure End:  7/10/2017 12:35 PM  Site Identification: surface landmarks    Patient Location:  OR  Indication: continuous blood pre

## 2017-07-10 NOTE — OPERATIVE REPORT
NEUROSURGERY OPERATIVE NOTE    Surgery Date: 07/10/17  Patient Name: Irma Shetty  Patient MR#: [id number]  Surgeon: Dr. Ronal Caldwell  Co-Surgeon: None  Assistant: None    Anesthesia:  GETA  Length: 2 hr  Antibiotics: IV  Specimen: Brain  EBL:  <10 cc  U taken at this site in different orientations. The biopsy needle was removed and there was noted to be no CSF or bleeding emerging from the biopsy site. The targeting frame was removed and then the burrhole was occluded with gelfoam.    Closure:  The skin

## 2017-07-10 NOTE — PROGRESS NOTES
[facility] HOSP - [facility]    Hematology/Oncology   Progress Note    Nicanor Moreno Patient Status:  Inpatient    1935 MRN [id number]   Location [facility] 2W/SW Attending Razia Angel MD   Hosp Day # 5 PCP Chris Knapp MD     S atherosclerosis. 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 Extensive coronary disease.  8. Additional incidental findings described above         Xr Chest Ap Portable  (cpt=71010)    Result Date: 7/10/2017

## 2017-07-10 NOTE — ANESTHESIA PREPROCEDURE EVALUATION
Anesthesia PreOp Note    HPI:     Tanner Fuentes is a [de-identified]year old male who presents for preoperative consultation requested by: Jenna Roper MD    Date of Surgery: 7/5/2017 - 7/10/2017    Procedure(s):  CRANIOTOMY  Indication: Left basal ganglia mass    * Prior to Admission:  METOPROLOL SUCCINATE ER 25 MG Oral Tablet 24 Hr TAKE 1/2 TABLET (12.5 MGTOTAL) BY MOUTH DAILY. Disp: 45 tablet Rfl: 3 7/4/2017 at Unknown time   Warfarin Sodium (COUMADIN) 5 MG Oral Tab Take 1 tablet (5 mg total) by mouth nightly.  Disp 07/10/17 0140 500 mg at 07/10/17 0140   dextrose 50% injection 50 mL 50 mL Intravenous PRN Rc Douglas MD     Glucose-Vitamin C (DEX-4) 4-0.006 g chewable tab 4 tablet 4 tablet Oral Q15 Min PRN Rc Douglas MD     insulin aspart (NOVOLOG) 100 Results  Component Value Date   WBC 7.6 07/10/2017   RBC 4.27 (L) 07/10/2017   HGB 13.5 07/10/2017   HCT 39.1 (L) 07/10/2017   MCV 91.6 07/10/2017   MCH 31.5 07/10/2017   MCHC 34.4 07/10/2017   RDW 14.1 07/10/2017    07/10/2017   MPV 9.2 07/10/2017

## 2017-07-10 NOTE — ANESTHESIA POSTPROCEDURE EVALUATION
Patient: Vito Jacobs    Procedure Summary     Date:  07/10/17 Room / Location:  [address] [place] [address] MAIN OR 03 / [address] [place] [address] OR    Anesthesia Start:  1782 Anesthesia Stop:      Procedure:  [id number] (Left ) Diagnosis:  (Left basal ganglia mass)    Surgeon:  Abelardo Burnett,

## 2017-07-10 NOTE — SLP NOTE
Pt in surgery at time of SLP attempt. Will follow as appropriate 7/11. RN aware.      Thank you,  Brandon Quinones MA, [address] Pathologist  [place]. [phone]

## 2017-07-10 NOTE — PROGRESS NOTES
[facility]    Progress Note    Mya Cox Patient Status:  Inpatient    1935 MRN [id number]   Location [facility] 2W/SW Attending Michael Hudson MD   Hosp Day # 5 PCP Fritz Curling.  MD Aria       Subjective:   Jason Mccarthy metoprolol tartrate  25 mg Oral TID Beta Blocker/Cardiac   • levETIRAcetam  500 mg Intravenous Q12H   • insulin aspart  1-5 Units Subcutaneous TID CC   • dexamethasone Sodium Phosphate  4 mg Intravenous Q6H   • famoTIDine  20 mg Oral Daily    Or   • famoTI Bibasilar parenchymal scarring/atelectasis. 2. Colonic diverticulosis. 3. Moderate stool throughout the colon consistent with constipation/fecal retention with Rectal fecal impaction measuring 9.5 x 8.1 cm transversely.  4. Left renal cortical cyst. Right r I anticipate that, after discharge, patient will require TBD.

## 2017-07-10 NOTE — PROGRESS NOTES
Mr. Leilani Renteria is resting comfortably. His INR is 1.2 today and he is neurologically stable. I ordered a consent for a stereotactic biopsy of left basal ganglia mass for surgery with Dr. Tressie Galeazzi. Mr. Shira Liang power of  should sign this consent.     We p

## 2017-07-10 NOTE — CONSULTS
[place] [facility] - [facility]  Palliative Care Follow Up    Maty Stephen Patient Status:  Inpatient    1935 MRN [id number]   Location [facility] 2W/SW Attending Darcy Kelley MD   Hosp Day # 5 PCP Tre Knapp MD     Date of Consult mg, Oral, TID Beta Blocker/Cardiac  •  metoprolol Tartrate (LOPRESSOR) 5 MG/5ML injection 5 mg, 5 mg, Intravenous, PRN **OR** metoprolol Tartrate (LOPRESSOR) 5 MG/5ML injection 2.5 mg, 2.5 mg, Intravenous, PRN  •  levETIRAcetam (KEPPRA) 500 mg in sodium ch 7/9/2017  CONCLUSION:  1. Emphysematous changes. Bibasilar parenchymal scarring/atelectasis. 2. Colonic diverticulosis.  3. Moderate stool throughout the colon consistent with constipation/fecal retention with Rectal fecal impaction measuring 9.5 x 8.1 cm t Directive: Health care treatment directive  Healthcare Agent Appointed: Yes  Healthcare Agent's Name: Arti Magaly (dtr)- surrogate  Healthcare Agent's Phone Number: [phone]  Pre-existing DNR/DNI Order: Yes, notify physician for order  Nicki Sheppard

## 2017-07-10 NOTE — PROGRESS NOTES
[facility] Dosing Service  Antibiotic Dosing    Lori Frost is a [de-identified]year old male for whom pharmacy is dosing zosyn for treatment of  pneumonia. Other antibiotics (Not dosed by pharmacy): none    Allergies: has No Known Allergies.     Vitals: /8

## 2017-07-10 NOTE — CONSULTS
[facility] - [facility]    Consult Note    Date:  7/10/2017  Date of Admission:  7/5/2017      Chief Complaint:   Winston Naik is a(n) [de-identified]year old male with brain mass.     HPI:   The patient has a history of normal pressure hydrocephalus status post V years ago after 1-1-1/2 packs per day, occasional wine  Smoking status: Former Smoker                                                              Packs/day: 0.00      Years: 40.00        Types: Cigarettes     Start date: 9/22/1974     Quit date: 9/22/2013 nontender, without hepatosplenomegaly and no mass appreciable. Extremities without clubbing cyanosis nor edema.    Neurologic the patient has more spontaneous movement of his left side than right but otherwise cannot cooperate with examination and he has status post shunt    I am delighted to assist with Mr. Soares Bryan care. Greater than 35 minutes critical care time. I have spoken at length over the phone with the patient's daughter Kiara Gonzalez.         Malik Navarro MD  Medical Director, Critical Care, E

## 2017-07-11 NOTE — CONSULTS
[facility]  Palliative Care Follow Up    Nicanor Moreno Patient Status:  Inpatient    1935 MRN [id number]   Location [facility] 2W/SW Attending Razia Angel MD   Hosp Day # 6 PCP Chris Knapp MD     Date of Consult in sodium chloride 0.9 % 100 mL IVPB, 500 mg, Intravenous, Q12H  •  dextrose 50% injection 50 mL, 50 mL, Intravenous, PRN  •  Glucose-Vitamin C (DEX-4) 4-0.006 g chewable tab 4 tablet, 4 tablet, Oral, Q15 Min PRN  •  insulin aspart (NOVOLOG) 100 UNIT/ML fl Chest+abdomen+pelvis(all Cntrst Only)(cpt=71260/50351)    Result Date: 7/9/2017  CONCLUSION:  1. Emphysematous changes. Bibasilar parenchymal scarring/atelectasis. 2. Colonic diverticulosis.  3. Moderate stool throughout the colon consistent with constipati information: unsure, speak to family  Patient's decision making preferences: unsure, speak to family  Code status: DNR  Have advanced directives been discussed with patient or healthcare power of : Yes  Advance Directive: Copy on chart  Type of Hea visit with Ankita Vides will continue to follow clinically.       MOISES Stanley, [facility] [id number]  7/11/2017  11:19 AM

## 2017-07-11 NOTE — PROGRESS NOTES
[facility]    Progress Note    Marvine Runner Patient Status:  Inpatient    1935 MRN [id number]   Location [facility] 2W/SW Attending Juliana Cornejo MD   Hosp Day # 6 PCP Nenita Salas.  MD Aria       Subjective:   Giovanni Blanco Beta Blocker/Cardiac    Or   • metoprolol tartrate  50 mg Oral TID Beta Blocker/Cardiac    Or   • metoprolol tartrate  25 mg Oral TID Beta Blocker/Cardiac   • levETIRAcetam  500 mg Intravenous Q12H   • insulin aspart  1-5 Units Subcutaneous TID CC   • libia Brain Or Head (03492)    Result Date: 7/11/2017  CONCLUSION:  1. Expected interval postsurgical changes of left frontal approach biopsy of the hemorrhagic mass centered within the left basal nuclei.  2. Stable right parietal approach ventriculoperitoneal sh status most likely 2/2 to brain mass  -ABG was ok  -cont to monitor  -haldol prn  -restraints prn    Paroxysmal atrial fibrillation for which the patient has been on Coumadin for some time.   I think in this elderly gentleman with significant fall, would fi

## 2017-07-11 NOTE — PLAN OF CARE
NEUROLOGICAL - ADULT    • Achieves stable or improved neurological status Progressing    • Absence of seizures Progressing        Safety Risk - Non-Violent Restraints    • Patient will remain free from self-harm Progressing        Neurologically stable.  CT

## 2017-07-11 NOTE — PROGRESS NOTES
Neurology Inpatient Follow-up Note      HPI:     Patient now status post brain biopsy; path results pending.       Past Medical Hisotory:  Reviewed    Medications:  Reviewed    Allergies:  No Known Allergies      ROS:   Unable to obtain comprehensive review

## 2017-07-11 NOTE — PROGRESS NOTES
[facility] HOSP - [facility]    Hematology/Oncology   Progress Note    Valerie Marinelli Patient Status:  Inpatient    1935 MRN [id number]   Location [facility] 2W/SW Attending Awa Lopez MD   Hosp Day # 6 PCP Grace Carrasquillo.  MD Aria     S 2. Colonic diverticulosis. 3. Moderate stool throughout the colon consistent with constipation/fecal retention with Rectal fecal impaction measuring 9.5 x 8.1 cm transversely. 4. Left renal cortical cyst. Right renal cortical scarring.  5. Ventriculoperiton

## 2017-07-11 NOTE — PROGRESS NOTES
POD #1    Mr. Juanis Strong is resting comfortably. When awake he denies headache, nausea, vomiting, or dizziness. Feels \"okay. \"    VSS-afebrile  Pupils bilaterally reactive, uneven on left due to prior cataract extraction  Mild left facial droop  Moving all

## 2017-07-11 NOTE — DISCHARGE PLANNING
SW following up on d/c planning for the patient. Per report, possible transfer to the floor. SW spoke with [facility] and they will accept the patient. Prior referral sent to [facility] for palliative care as well and SW will notify them of d/c.

## 2017-07-11 NOTE — PROGRESS NOTES
[facility]     Progress Note        Ricardo Peacock Patient Status:  Inpatient    1935 MRN [id number]   Location [facility] 2W/SW Attending Dahiana Levin MD   Hosp Day # 6 PCP Melissa Wilkinson.  MD Aria       Subjective:   Debbi Bain (DEX-4) 4-0.006 g chewable tab 4 tablet 4 tablet Oral Q15 Min PRN   insulin aspart (NOVOLOG) 100 UNIT/ML flexpen 1-5 Units 1-5 Units Subcutaneous TID CC   haloperidol lactate (HALDOL) 5 MG/ML injection 1 mg 1 mg Intramuscular Q6H PRN   dexamethasone Sodium 9.5 x 8.1 cm transversely. 4. Left renal cortical cyst. Right renal cortical scarring. 5. Ventriculoperitoneal shunt extends in the right lower quadrant region. 6. No significant lymphadenopathy within the chest abdomen or pelvis.  7. Extensive aortoiliac a

## 2017-07-12 NOTE — PROGRESS NOTES
[place] [facility] - [facility]    Hematology/Oncology   Progress Note    Telly Lamont Patient Status:  Inpatient    1935 MRN [id number]   Location [facility] 2W/SW Attending Emma Caro MD   Hosp Day # 7 PCP Carla Knapp MD     S Colonic diverticulosis. 3. Moderate stool throughout the colon consistent with constipation/fecal retention with Rectal fecal impaction measuring 9.5 x 8.1 cm transversely. 4. Left renal cortical cyst. Right renal cortical scarring.  5. Ventriculoperitoneal

## 2017-07-12 NOTE — PLAN OF CARE
CARDIOVASCULAR - ADULT    • Maintains optimal cardiac output and hemodynamic stability Progressing    • Absence of cardiac arrhythmias or at baseline Progressing        Diabetes/Glucose Control    • Glucose maintained within prescribed range Progressing and drains. Neuro checks q4 maintained. Plan to await bx results.

## 2017-07-12 NOTE — PROGRESS NOTES
POD #2    Mr. Lenin Ang is resting comfortably. When awake he denies headaches.       VSS-afebrile  Wound CDI  Pupils PERRL  Face symmetric  Following commands  He does not participate in the remainder of the physical exam    A CT scan of the head from 7/11/2

## 2017-07-12 NOTE — PROGRESS NOTES
[address] [facility] Heart Cardiology Progress Note      Graciela Gandara Patient Status:  Inpatient    1935 MRN [id number]   Location [facility] 4W/SW/SE Attending Sherry Smiley MD   Hosp Day # 7 PCP Valerie Salcedo.  Aria, Daily   • Metoprolol Succinate ER  12.5 mg Oral Daily Beta Blocker   • piperacillin-tazobactam  3.375 g Intravenous Q8H   • levETIRAcetam  500 mg Intravenous Q12H   • Insulin Aspart Pen  1-5 Units Subcutaneous TID CC   • dexamethasone Sodium Phosphate  4 m

## 2017-07-12 NOTE — PROGRESS NOTES
[facility]    Progress Note    Priti Adamson Patient Status:  Inpatient    1935 MRN [id number]   Location [facility] 2W/SW Attending Viet Pratt MD   [facility] Day # 7 PCP Ricardo Knapp MD       Subjective:   Ivory Rodriguez levETIRAcetam  500 mg Intravenous Q12H   • Insulin Aspart Pen  1-5 Units Subcutaneous TID CC   • dexamethasone Sodium Phosphate  4 mg Intravenous Q6H   • famoTIDine  20 mg Oral Daily    Or   • famoTIDine  20 mg Intravenous Daily       Current PRN Inpatient parietal approach ventriculoperitoneal shunt with unchanged ventricular size/morphology. 3. Similar size and morphology of the multifocal left basal nuclei hemorrhagic mass with components extending to involve the medial left thalamus.              Assessme

## 2017-07-12 NOTE — CONSULTS
[facility]  Palliative Care Follow Up    [facility] Patient Status:  Inpatient    1935 MRN [id number]   Location [facility] 2W/SW Attending Lia Crespo MD   Hosp Day # 7 PCP Jeannie Rodriguez.  MD Aria     Date of Consult mg, Intramuscular, Q6H PRN  •  dexamethasone Sodium Phosphate (DECADRON) 4 MG/ML injection 4 mg, 4 mg, Intravenous, Q6H  •  famoTIDine (PEPCID) tab 20 mg, 20 mg, Oral, Daily **OR** famoTIDine (PEPCID) injection 20 mg, 20 mg, Intravenous, Daily  •  ondanset normal, no murmur, rub or gallop. Lungs: Clear without wheezes, rales, rhonchi or dullness. Normal excursions and effort. Abdomen: Soft, non-tender, normal bowel sounds X 4 quadrants, no rebound or guarding  Extremities: Without clubbing, cyanosis.  Mar surrogate.  -Completed POLST form in chart.     Palliative Performance Scale 30%  -Newly diagnosed intracerebral mass, Afib, h/o NPH s/p  shunt, SSS s/p pacemaker, functional and cognitive decline with increased dependence on ADL's, wt loss of 16 pounds i

## 2017-07-12 NOTE — PLAN OF CARE
Impaired Activities of Daily Living    • Achieve highest/safest level of independence in self care Not Progressing        NEUROLOGICAL - ADULT    • Achieves maximal functionality and self care Not Progressing        Patient/Family Goals    • Patient/Family Repositioned q 1-2 hrs. Incontinent of urine, prompt care rendered. Frequent rounding done.

## 2017-07-12 NOTE — DIETARY NOTE
ADULT NUTRITION INITIAL ASSESSMENT    Pt is at moderate nutrition risk. Pt does not meet malnutrition criteria.       RECOMMENDATIONS TO MD:  RD to order and manage ONS (oral nutritional supplements)    NUTRITION DIAGNOSIS/PROBLEM:  Inadequate oral intake (148 lb 8 oz)   07/05/17 1849 68 kg (150 lb)   Wt Readings from Last 10 Encounters:  07/11/17 : 68.4 kg (150 lb 14.4 oz)  05/10/17 : 72.6 kg (160 lb)  03/06/17 : 73.5 kg (162 lb)  11/21/16 : 74.8 kg (165 lb)  11/16/16 : 73 kg (161 lb)  09/20/16 : 72.6 kg ( halt wt loss, PO and supplement greater than 75% of needs and prevent skin breakdown      DIETITIAN FOLLOW UP: RD to follow up within 7 days     [address], [address] [facility]   Clinical Dietitian [id number]

## 2017-07-12 NOTE — PROGRESS NOTES
[facility]    Progress Note      Assessment and Plan:   1. Brain tumor– differential diagnosis includes lymphoma and solid brain neoplasm. The patient is wakeful and tolerated the intervention well.   The pathology results are still pendin Critical Care, [facility]  Medical Director, [facility]  Pager: [phone]

## 2017-07-13 NOTE — PROGRESS NOTES
[facility] HOSP - [facility]    Hematology/Oncology   Progress Note    Kiet Salmon Patient Status:  Inpatient    1935 MRN [id number]   Location [facility] 2W/SW Attending Patito Sargent MD   Hosp Day # 8 PCP [address]  MD Aria     S (cpt=71010)    Result Date: 7/13/2017  CONCLUSION: 1. Clearing of the left basilar infiltrate from July 10, 2017. 2. The rest of the findings are stable. 3. Atherosclerosis. 4. Normal heart. 5. Demineralization. 6. Scoliosis. 7. Osteoarthritis.  8. Pacemake

## 2017-07-13 NOTE — PROGRESS NOTES
S: No events overnight    O:    07/13/17  0500   BP: 151/82   Pulse: 70   Resp: 18   Temp: 97.8 °F (36.6 °C)     Wound CDI  Awakens, follows commands      CT BRAIN (07/11/17):  Interval postsurgical changes of a left frontal sonal hole placement and sampling unchanged ventricular size/morphology. 3. Similar size and morphology of the multifocal left basal nuclei hemorrhagic mass with components extending to involve the medial left thalamus. A/P:    The patient is stable from his brain biopsy.

## 2017-07-13 NOTE — PROGRESS NOTES
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       Past Medical Hisotory:  Reviewed    Medications:  Reviewed    Allergies:  No Known Allergies      ROS:   Unable to obtain compreh

## 2017-07-13 NOTE — CONSULTS
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      Dear Slade Mills and colleagues,    As you recall, he is a [de-identified] yo gentleman with history of norm Disp: 1 Syringe Rfl: 0   famoTIDine 20 MG Oral Tab Take 1 tablet (20 mg total) by mouth daily. Disp: 30 tablet Rfl: 0   levETIRAcetam (KEPPRA) 500 MG Oral Tab Take 1 tablet (500 mg total) by mouth 2 (two) times daily.  Disp: 60 tablet Rfl: 0   dexamethasone exam is nonfocal.    COMPLETED TESTS:  I have reviewed the patient's clinical, pathologic and laboratory studies in [facility].     ASSESSMENT/PLAN    Results From Past 48 Hours:    Recent Results (from the past 48 hour(s))  -POCT GLUCOSE   Collection Time: 07/11 GLUCOSE   Collection Time: 07/12/17  9:09 PM   Result Value Ref Range   POC Glucose  227 (H) 70 - 99   -BASIC METABOLIC PANEL (8)   Collection Time: 07/13/17  5:09 AM   Result Value Ref Range   Glucose 186 (H) 70 - 99 mg/dL   Sodium 135 (L) 136 - 144 mmol/ 186 07/13/2017   CA 8.4 07/13/2017   PGLU 139 07/13/2017       Impression and Plan:    Patient Active Problem List:     H/O abdominal aortic aneurysm repair     Hypertension     Hyperlipidemia     Urinary incontinence     Constipation     Weakness of both                           Authorizing Provider: Kathy Dobbins MD           Collected:           07/10/2017 01:27 PM           Ordering Location:   [facility]          Received:            07/10/2017 01:34 PM                                  Elio Akins Basal Ganglia Mass.

## 2017-07-13 NOTE — PROGRESS NOTES
[facility]    Progress Note      Assessment and Plan:   1.    CNS lymphoma     Recommendations: As per oncology, radiation oncology and neurosurgery.     2. CODE STATUS–decision-maker surrogate daughter Bill See right requests DNR status.

## 2017-07-13 NOTE — PLAN OF CARE
GASTROINTESTINAL - ADULT    • Maintains adequate nutritional intake (undernourished) Not Progressing        Impaired Activities of Daily Living    • Achieve highest/safest level of independence in self care Not Progressing        NEUROLOGICAL - ADULT    •

## 2017-07-13 NOTE — CONSULTS
[facility]  Palliative Care Follow Up    Marvine Runner Patient Status:  Inpatient    1935 MRN [id number]   Location [facility] 2W/SW Attending Juliana Corneoj MD   Hosp Day # 8 PCP Nenita Salas.  MD Aria     Date of Consult tablet, 4 tablet, Oral, Q15 Min PRN  •  insulin aspart (NOVOLOG) 100 UNIT/ML flexpen 1-5 Units, 1-5 Units, Subcutaneous, TID CC  •  haloperidol lactate (HALDOL) 5 MG/ML injection 1 mg, 1 mg, Intramuscular, Q6H PRN  •  dexamethasone Sodium Phosphate (DECADR Peripheral pulses are 2+. Edema not present  Neurologic: Sleepy  Skin: Warm and dry.     Palliative Performance Scale : 30%    Palliative Care Goals of Care:  Discussed with Patient and Family: yes  Patient's preference about sharing medical information: un functional and cognitive decline with increased dependence on ADL's, wt loss of 16 pounds in past 4 months=palliative care appropriate    Emotion support provided to patient/family today: Yes      Palliative Care Follow Up:    A total of 15 mins were spent

## 2017-07-13 NOTE — PROGRESS NOTES
[facility]    Progress Note    Marvine Runner Patient Status:  Inpatient    1935 MRN [id number]   Location [facility] 2W/SW Attending Yuli Jackson MD   [facility] Day # 8 PCP Nenita Salas.  MD Aria       Subjective:   Giovanni Blanco mg Oral Daily Beta Blocker   • Insulin Aspart Pen  1-5 Units Subcutaneous TID CC   • famoTIDine  20 mg Oral Daily    Or   • famoTIDine  20 mg Intravenous Daily       Current PRN Inpatient Meds:      Senna-Docusate Sodium, bisacodyl, Normal Saline Flush, ba Scoliosis. 7. Osteoarthritis. 8. Pacemaker. 9.  shunt.             Assessment and Plan:   Hemorrhagic mass within the left basal ganglia with significant vasogenic edema, consistent with probable malignancy  -cont decadron for now  -keppra  -Ct brain done

## 2017-07-13 NOTE — DISCHARGE PLANNING
7/13CM-The Patient's RN informed this Writer that the Patient is medically stable for discharge today (7/13).  The Patient is currently on restraints, this Writer informed the Patient's RN that the Patient has to be off restraints for 24 hours prior to disc

## 2017-07-14 NOTE — SLP NOTE
SPEECH DAILY NOTE - INPATIENT        ASSESSMENT & PLAN   ASSESSMENT      Pt seen upright in bed with current diet of mechanical soft chopped/thin liquids (breakfast tray)  for monitoring of diet tolerance.   Swallowing precautions/strategies discussed and d demonstrate understanding and implementation of aspiration precautions and swallow strategies independently over 3 session(s).     Education provided  to Pt with written instruction.  Discussed swallowing precautions/strategies with RN.      GOAL MET   Goal

## 2017-07-14 NOTE — PROGRESS NOTES
[facility]    Progress Note    Myasienna Amandamedhat Patient Status:  Inpatient    1935 MRN [id number]   Location [facility] 2W/SW Attending Ulises Liriano MD   [address] Day # 9 PCP Fritz Curling.  MD Aria       Subjective:   Jason Learn ER  12.5 mg Oral Daily Beta Blocker   • Insulin Aspart Pen  1-5 Units Subcutaneous TID CC   • famoTIDine  20 mg Oral Daily    Or   • famoTIDine  20 mg Intravenous Daily       Current PRN Inpatient Meds:      Senna-Docusate Sodium, bisacodyl, Normal Saline Scoliosis. 7. Osteoarthritis. 8. Pacemaker. 9.  shunt.             Assessment and Plan:   Hemorrhagic mass within the left basal ganglia with significant vasogenic edema, consistent with probable malignancy  -cont decadron for now, on PO  -keppra, on PO

## 2017-07-14 NOTE — CM/SW NOTE
CTL note:  Pt had been confused and in restraints. Restraints removed today. Pt is eating, taking meds. CTL spoke with pt's nurse - PT/OT resumption orders completed.

## 2017-07-14 NOTE — PLAN OF CARE
SKIN/TISSUE INTEGRITY - ADULT    • Skin integrity remains intact Not Progressing          CARDIOVASCULAR - ADULT    • Maintains optimal cardiac output and hemodynamic stability Progressing    • Absence of cardiac arrhythmias or at baseline Progressing Seen by speech. [facility] soft diet ordered. Soft restraints d/c. IV saline locked. Pt not disturbing IV site. Taking meds po with pudding. Appetite improved. Written communication helpful for [facility]. Plan is to be d/c to rehab.

## 2017-07-14 NOTE — CONSULTS
[facility]  Palliative Care Follow Up    Tatiana Fernando Patient Status:  Inpatient    1935 MRN [id number]   Location [facility] 2W/SW Attending Dorcas Davenport MD   [address] Day # 9 PCP Latoya Garnica.  MD Aria     Date of Consult mg, Intravenous, Q6H  •  levETIRAcetam (KEPPRA) 250 mg in sodium chloride 0.9 % 100 mL IVPB, 250 mg, Intravenous, Q12H  •  lisinopril (PRINIVIL,ZESTRIL) tab 2.5 mg, 2.5 mg, Oral, Daily  •  Metoprolol Succinate ER (Toprol XL) 24 hr tab 12.5 mg, 12.5 mg, Ora CA 8.4 (L) 07/13/2017   ALB 3.2 (L) 07/06/2017   ALKPHO 56 07/06/2017   BILT 1.4 (H) 07/06/2017   TP 6.4 07/06/2017   AST 58 (H) 07/06/2017   ALT 36 07/06/2017   MG 2.3 07/08/2017   TROP 0.03 07/05/2017       Imaging:  Xr Chest Ap Portable  (cpt=71010) Appointed: Yes  Healthcare Agent's Name: Viet Haywood (dtr)  Healthcare Agent's Phone Number: [phone]  Pre-existing DNR/DNI Order: Yes, notify physician for order  Describe Patient Wishes: DNR, DNI, continue supportive care    Spiritual needs address >50% was spent counseling and coordinating care. Discussed today's visit with [facility] MARK ANTHONY and Dr. Storm Landau    I will sign off as I have addressed [address] and no active symptoms at this time. Please call [phone] if needed again.       MOISES Troncoso, [facility]

## 2017-07-15 NOTE — PHYSICAL THERAPY NOTE
PHYSICAL THERAPY EVALUATION - INPATIENT     Room Number: 451/451-A  Evaluation Date: 7/15/2017  Type of Evaluation: Re-evaluation  Physician Order: PT Eval and Treat (re-eval)    Presenting Problem: intracranial mass  Reason for Therapy: Mobility Dysfu Surgical repair   • BPH (benign prostatic hyperplasia)    • Hydrocephalus 2016   • Unspecified essential hypertension        Past Surgical History  Past Surgical History:  June/July 2014: ANGIOGRAM Bilateral  7-: OTHER SURGICAL HISTORY      Comment: sheets and blankets)?: Unable   -   Sitting down on and standing up from a chair with arms (e.g., wheelchair, bedside commode, etc.): Unable   -   Moving from lying on back to sitting on the side of the bed?: Unable   How much help from another person does Goal #3   Current Status    Goal #4 Patient will negotiate 0 stairs/one curb w/ assistive device and supervision   Goal #4   Current Status    Goal #5 Patient to demonstrate independence with home activity/exercise instructions provided to patient in pre

## 2017-07-15 NOTE — DISCHARGE SUMMARY
[facility]    Discharge Summary    Robert Hodges Patient Status:  Inpatient    1935 MRN [id number]   Location [facility] 4W/SW/SE Attending Makayla Walden MD   Hosp Day # 10 PCP Jose Fang.  MD Aria     Date of Admiss and was comfortable. SKIN:  Warm and hydrated  PSYCHIATRIC: Calm and cooperative   HEENT:  Head was atraumatic and normocephalic. Eyes: Sclera was anicteric. Pupils were equal.   NECK:  Supple. There was no JVD.    CHEST:  Symmetrical movement on inspir Hemorrhagic mass within the left basal ganglia with significant vasogenic edema, consistent with probable malignancy  cont decadron for now  keppra, on PO  Ct brain done for surgical mapping  s/p brain biopsy - path showing DLBCL  appreciate palliative c tablet  Refills:  0        CONTINUE taking these medications      Instructions Prescription details   lisinopril 2.5 MG Tabs  Commonly known as:  PRINIVIL,ZESTRIL      TAKE 1 TABLET (2.5 MG TOTAL) BY MOUTH EVERY MORNING.    Quantity:  90 tablet  Refills:  3

## 2017-07-15 NOTE — DISCHARGE PLANNING
7/5/17 CM Discharge planning / MDO transfer to rehab   Spoke with Lei Caballero adm at [facility], pt is off restraints. Bed available at [facility] today at 4:00 pm, RN report [phone], transport arranged via [address] [place] [address].   Dtr Moon ruvalcaba and in agr

## 2017-07-15 NOTE — PLAN OF CARE
Pt in bed throughout shift. Sitting up for meals. Repositioned frequently and rounded on frequently. Seen by PT/OT. Appetite increased. VSS. Venelex to bottom. To be d/c to [facility] today. Daughter Cale notified via phone.      Report called to Gabriele jarquin

## 2017-07-15 NOTE — OCCUPATIONAL THERAPY NOTE
OCCUPATIONAL THERAPY EVALUATION - INPATIENT     Room Number: 451/451-A  Evaluation Date: 7/15/2017  Type of Evaluation: Initial  Presenting Problem: s/p craniotomy    Physician Order: IP Consult to Occupational Therapy  Reason for Therapy: ADL/IADL Dysfunc hydrocephalus)    Primary CNS lymphoma [facility])      Past Medical History  Past Medical History:   Diagnosis Date   • AAA (abdominal aortic aneurysm) [facility]) June 2014    Surgical repair   • BPH (benign prostatic hyperplasia)    • Hydrocephalus 2016   • Unspeci currently need…  -   Putting on and taking off regular lower body clothing?: Total  -   Bathing (including washing, rinsing, drying)?: Total  -   Toileting, which includes using toilet, bedpan or urinal? : Total  -   Putting on and taking off regular upper

## 2017-07-17 NOTE — PROGRESS NOTES
Mya Cox  : 1935  Age [de-identified]year old  male patient is admitted to [facility] 7/15/17 for rehab      Chief complaint: hemorrhagic mass c/w DLBCL, vasogenic edema, AMS, Pacemaker, afib       HPI   81-y/o male who is unable to provide any medic 2014: ANGIOGRAM Bilateral  7-: OTHER SURGICAL HISTORY      Comment: shunt put in  No family history on file.   Smoking status: Former Smoker                                                              Packs/day: 0.00      Years: 40.00        Types: lesions or rashes  WOUNDS: none  EYES:no visual complaints or deficits  HENT: denies nasal congestion, sinus pain or sore throat; wears hearing aids, deaf  RESPIRATORY: denies shortness of breath, wheezing or cough   CARDIOVASCULAR:denies chest pain, no pa appropriate      MEDICAL DECISION MAKING  UNABLE, daughter is POA    DIAGNOSTICS REVIEWED AT THIS VISIT:  REVIEWED [facility] records    SEE PLAN BELOW    1.  Hemorrhagic mass within the left basal ganglia with significant vasogenic edema, consistent with  maligna

## 2017-07-18 NOTE — PROGRESS NOTES
Priti Adamson  : 1935  Age [de-identified]year old  male patient is admitted to [facility] 7/15/17 for Rehabilitation     Admitted to [facility] on: 17 to 7/15/17    Chief complaint:hemorrhagic mass c/w DLBCL, vasogenic edema, AMS, Pacemaker, a in place. Cont on decadron 4mg po bid. Incision to head with sutures in place dry and intact. , no s/s of infection. Participates slightly in therapy, reports doesn't want to be here. Unclear of discharge plan, had been living with x wife and daughter.  VS SpO2 97%       REVIEW OF SYSTEMS:  GENERAL HEALTH:feels well otherwise, very confused and needs close monitoring , at nurses station   SKIN: denies any unusual skin lesions or rashes  WOUNDS: head incision with sutures  EYES:no visual complaints or deficit rebound tenderness.   :URIANTING WELL, ADULT DIAPERS, INCONT  LYMPHATIC:no lymphedema  MUSCULOSKELETAL: no acute synovitis upper or lower extremity  EXTREMITIES/VASCULAR:no cyanosis, clubbing or edema  NEUROLOGIC: CONFUSED  PSYCHIATRIC: oriented x 1; sitt consult , to follow in rehab  -pacemaker  -vs q shift  -ctm   6.  HTN  -vs q shift  -presently controlled, currently 139/83 with p 66  -cont Metoprolol succinate ER 12.5mg po daily   -cont Lisinopril 2.5mg po daily         This is a 25 minute visit and pushpa

## 2017-07-19 NOTE — PROGRESS NOTES
Nashyamil Eatonsandy  : 1935  Age [de-identified]year old  male patient is admitted to [facility]  7/15/17  for Rehabilitation     Admitted to [facility] on:17 to 7/15/17    Chief complaint:hemorrhagic mass c/w DLBCL, vasogenic edema, AMS, Pacemaker, bed, wanting to stay in bed,  being monitored by CNA's and RN. Fall risk parameters in place. Cont on decadron 4mg po bid. Incision to head with sutures in place dry and intact. , no s/s of infection.  Participates slightly in therapy, reports doesn't wan Disp: 90 tablet Rfl: 3   multivitamin Oral Tab Take 1 tablet by mouth daily. Disp: 1 tablet Rfl: 0   Probiotic Product (PROBIOTIC DAILY) Oral Cap Take 1 capsule by mouth daily.  Disp:  Rfl:        VITALS:  /74   Pulse 68   Temp (!) 97.2 °F (36.2 °C) ( no TMG, no carotid bruits  BREAST: no masses, no nipple discharge, no nodes; normal skin  RESPIRATORY:clear to percussion and auscultation  CARDIOVASCULAR: irreg HR, PACEMAKER  ABDOMEN:  normal active BS+, soft, nondistended; no organomegaly, no masses; no Appointments iqra f/u with both med onc and rad onc and pcp  -no reported seizures, will cont Keppra 250mg po bid  -now on an air mattress and colby placed on floor near bed     5. Paroxysmal atrial fibrillation  -Patient has been on Coumadin for some time.

## 2017-07-20 NOTE — PROGRESS NOTES
Darcie Stephen  : 1935  Age [de-identified]year old  male patient is admitted to [facility] 7/15/17  for Rehabilitation     Admitted to [facility] on:17 to 7/15/17    Chief complaint: hemorrhagic mass c/w DLBCL, vasogenic edema, AMS, Pacemaker, home, recommended to the nurses and aides that they write messages to him, he can read them. . Now has an air mattress and pad placed next to bed for seizure precautions. Con to be  monitored by CNA's and RN.   Fall risk parameters in place.  Cont on decad (4 mg total) by mouth 2 (two) times daily with meals. Disp: 28 tablet Rfl: 0   METOPROLOL SUCCINATE ER 25 MG Oral Tablet 24 Hr TAKE 1/2 TABLET (12.5 MGTOTAL) BY MOUTH DAILY.  Disp: 45 tablet Rfl: 3   LISINOPRIL 2.5 MG Oral Tab TAKE 1 TABLET (2.5 MG TOTAL) B  none  SKIN: no rashes, no suspicious lesions  WOUND: head incision with sutures intact, no s/s of infection  EYES: PERRLA, EOMI, sclera anicteric, conjunctiva normal; there is no nystagmus, no drainage from eyes  HENT: normocephalic; normal nose, no nasal track of BM's daily     3. Hyperglycemia A1C 5.8  -may need  sliding scale, currently not on insulin  -BS controlled  -is on decadron so needs to be monitored tid  -accucheck bid, currently 140  -ctm     4.  Altered mental status most likely 2/2 to brain ma

## 2017-07-20 NOTE — TELEPHONE ENCOUNTER
Called pt's daughter, Radha Snow asking if they plan to proceed with radiation to the brain. States she is unsure if pt will be able to lay on a table with a mask, and be completely still. Radha Snow states she will call me after discussing further with her father.

## 2017-07-24 NOTE — PROGRESS NOTES
Lani Nuno  : 1935  Age [de-identified]year old  male patient is admitted to [place] [address] for 7/15/17  for Rehabilitation     Admitted to [facility] AND [facility] on:17 to 7/15/17     Chief complaint:hemorrhagic mass c/w DLBCL, vasogenic edema, AMS, Pacemak sometimes confused and head feels funny. Does not have hearing aides, lost at home, recommended to the nurses and aides that they write messages to him, he can read them and responds appropriately.   Air mattress and pad placed next to bed for seizure prec 20 MG Oral Tab Take 1 tablet (20 mg total) by mouth daily. Disp: 30 tablet Rfl: 0   dexamethasone (DECADRON) 4 MG tablet Take 1 tablet (4 mg total) by mouth 2 (two) times daily with meals.  Disp: 28 tablet Rfl: 0   METOPROLOL SUCCINATE ER 25 MG Oral Tablet allergies        PHYSICAL EXAM:  GENERAL HEALTH: well developed, well nourished, in no apparent distress, slightly  Confused at times , does try to get out of chair , needs to be monitored.  When writing out messages pt does better- able to communicate this not decided yet if he will be doing this tx ( Dr Jose Mccollum aware per epic notes).  , appointments iqra for f/u with pcp, rad onc and med onc   -cbc and bmp weekly  -please write out questions and any messages for him, he does not have hearing aides and he will resp States he is up in the w/c at times, but remains very weak overall. Will await her return call.  Updated Dr Lauren Tam. Currently talking to Palliative, unsure at this time what agency they will -will need to f/u        This is a 15 minute visit and greater than 50

## 2017-07-26 NOTE — PROGRESS NOTES
Safety Plan Of Care:    Safety Problem:  Risk of injury  Risk of fall    Related to:     Fall history  Diminished physical activity  Diminished safe judgement  Disease process    General Safety Interventions:  Assess/compensate for sensory deficits  Provide

## 2017-07-26 NOTE — PROGRESS NOTES
Primary language:  English  Language line required?  no  Comprehension Ability:  fair  Able to read?  yes  Able to write?  no  Communication tools:  Writing tablet  Patient's ability to learn:  fair  Readiness to learn:  Unable  Learning preferences:  Writ

## 2017-07-26 NOTE — PROGRESS NOTES
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      Dear Slade Sena and colleagues,     As you recall, he is a [de-identified] yo gentleman with history of normal pressure hydrocephalus history of AAA (abdominal aortic aneurysm) [facility]) (June 2014); BPH (benign prostatic hyperplasia); Hydrocephalus (2016); Lymphoma of central nervous system ([facility] [address] Pacemaker; and Unspecified essential hypertension.  He also has no past medical history of Diab left hip     Atrial fibrillation (HCC) [I48.91]     Atrial fibrillation, unspecified type ([facility] [address]     Abscessed tooth     Intracerebral mass     Altered mental status     Elevated troponin     Sick sinus syndrome (HCC)     Goals of care, counseling/discussio

## 2017-07-26 NOTE — PROGRESS NOTES
Nursing Consultation Note  Patient: Liam Garrett  YOB: 1935  Age: [de-identified]year old  Radiation Oncologist: Dr. Kylee Blanca  Referring Physician: No ref. provider found  Diagnosis:No diagnosis found.   Consult Date: 7/26/2017      Chemotherapy: N total) into the muscle every 6 (six) hours as needed. Disp: 1 Syringe Rfl: 0   famoTIDine 20 MG Oral Tab Take 1 tablet (20 mg total) by mouth daily.  Disp: 30 tablet Rfl: 0   dexamethasone (DECADRON) 4 MG tablet Take 1 tablet (4 mg total) by mouth 2 (two) t Other Topics Concern   None on file     Social History Narrative   None on file       ECOG:  Grade 4 - Completely disabled, totally confined to chair or bed. Cannot carry on any self-care. Education:  y    Are ADL's met?   Yes  Does patient feel saf

## 2017-07-26 NOTE — PROGRESS NOTES
Priti Adamson  : 1935  Age [de-identified]year old  male patient is admitted to [facility] 7/15/17 for Rehabilitation     Admitted to [facility] AND [facility] on: 17 to 7/15/17     Chief complaint: hemorrhagic mass c/w DLBCL, vasogenic edema, AMS, Pacemaker, sometimes confused and head feels different. Does not have hearing aides, lost at home, recommended to the nurses and aides that they write messages to him, he can read them and responds appropriately.  Daughter discussed plan of RT to brain, meeting with METOPROLOL SUCCINATE ER 25 MG Oral Tablet 24 Hr TAKE 1/2 TABLET (12.5 MGTOTAL) BY MOUTH DAILY. Disp: 45 tablet Rfl: 3   LISINOPRIL 2.5 MG Oral Tab TAKE 1 TABLET (2.5 MG TOTAL) BY MOUTH EVERY MORNING.  Disp: 90 tablet Rfl: 3   multivitamin Oral Tab Take 1 does better- able to communicate this way, hearing aid lost  LINES, TUBES, DRAINS:  none  SKIN: no rashes, no suspicious lesions  WOUND: head incision with sutures intact, no s/s of infection  EYES: PERRLA, EOMI, sclera anicteric, conjunctiva normal; there as ordered for constipation, prn colace bid and miralax  -please keep track of BM's daily  -reports bm this am per RN  3.  Hyperglycemia A1C 5.8  -may need  sliding scale, currently not on insulin  -BS controlled  -is on decadron so needs to be monitored ti

## 2017-07-26 NOTE — PROGRESS NOTES
Neurological Plan Of Care:    Problem:    Risk of injury  Impaired mobility  Self care deficit  Potential for seizure  Impaired Neuro status    Problems related to:    Disease process    Interventions:  Assess neuro status  Assess cognitive function  Asses

## 2017-07-27 NOTE — PROGRESS NOTES
Cancer Center Progress Note    Patient Name: Valerie Marinelli   YOB: 1935   Medical Record Number: [id number]   Attending Physician: Marcos Gonzalez M.D.      Chief Complaint:  Primary CNS lymphoma, history of normal pressure hydrocephalus    Hist Former Smoker  For 40.00 Years     Types: Cigarettes    Start date: 9/22/1974    Quit date: 9/22/2013    Smokeless tobacco: Never Used    Alcohol use Yes    Comment: Occasionally    Drug use: No    Sexual activity: Not on file     Other Topics Concern   No Extremities: No edema. Neurological: 5/5 motor x4.         Laboratory:  Recent Labs   Lab  07/13/17   0509   WBC  7.8   HGB  13.9   PLT  139*   NE  6.4           Lab Results  Component Value Date    (H) 07/13/2017   BUN 27 (H) 07/13/2017   BUNCREA

## 2017-08-01 NOTE — PROGRESS NOTES
Lani Nuno  : 1935  Age [de-identified]year old  male patient is admitted to [place] [address] for  7/15/17 for Rehabilitation     Admitted to [facility] AND [facility] on: 17 to 7/15/17     Chief complaint:  hemorrhagic mass c/w DLBCL, vasogenic edema, AMS, Pace perform transfers from w/c to mat with physical therapy. Staff reports patient more interactive today and participating, saying occasional words. Was evaluated by Palliative APN today from [facility].  RN called Dr Lyssa Chaparro for post op f/u and suture removal for h Disp: 28 tablet Rfl: 0   METOPROLOL SUCCINATE ER 25 MG Oral Tablet 24 Hr TAKE 1/2 TABLET (12.5 MGTOTAL) BY MOUTH DAILY. Disp: 45 tablet Rfl: 3   LISINOPRIL 2.5 MG Oral Tab TAKE 1 TABLET (2.5 MG TOTAL) BY MOUTH EVERY MORNING.  Disp: 90 tablet Rfl: 3   multiv distress, slightly  Confused at times , does try to get out of chair , needs to be monitored.  When writing out messages pt does better- able to communicate this way, hearing aid lost  LINES, TUBES, DRAINS:  none  SKIN: no rashes, no suspicious lesions  WOU education and counseling done with family   -cbc and bmp weekly  -please write out questions and any messages for him, he does not have hearing aides and he will respond   2.  Constipation  Resolved  -cont meds as ordered for constipation, prn colace bid an

## 2017-08-07 NOTE — TELEPHONE ENCOUNTER
As FYI to DR. MYRICK - called daughter who states  Her father is so weak after radiation today that radiologist told her to bring him to ER.  Daughter will bring him to ED tomorrow so they can remove sutures also F/U Wednesday 8/9

## 2017-08-07 NOTE — TELEPHONE ENCOUNTER
Laine Cortez is calling to see if Dr. Zahraa Ely can remove the sutures in her dad's head from his brain biopsy. Pt. Is at [facility] and Laine Cortez was told that she needs to have her dad transported to [facility] back to the place where he had the biopsy done.  She states h

## 2017-08-07 NOTE — PROGRESS NOTES
Quincy Rodriguez, 11/13/1935, [de-identified]year old, male at [facility] 6 on  7/15/17 for Rehabilitation     Chief Complaint: hemorrhagic mass c/w DLBCL, vasogenic edema, AMS, Pacemaker, afib, confusion/brain tumor    Subjective: Fatigue    Pt had radiation treatment 11.46, RBC 5.17, HGB 16.3, HCT 46.7, PLT 79  BMP: Creatinine 1.26, BUN 34, Glucose 139, CA 8.6, K 5.0, , Chloride 98  Keppra level: 13.4     STAT CBC, BMP, UA pending on 8/7/17  May need to put XRT appointment on hold tomorrow if pt still weak  Had p -Will cont to hold coumadin  -cardiology following in rehab  -pacemaker  -vs q shift, vss, HTN at times  -ctm   6.  HTN  -vs q shift  -presently controlled, currently 115/73 with p 67  -cont Metoprolol succinate ER 12.5mg po daily   -cont Lisinopril 2.5mg

## 2017-08-07 NOTE — TELEPHONE ENCOUNTER
Give order to nurses at [facility] to remove sutures or one of the doctors who goes there such as Dr Robert Cody. If they will not do, see if he can go to ED on a visit to RT and heve them removed. He can also come here.

## 2017-08-07 NOTE — PROGRESS NOTES
[facility] Radiation Treatment Management Note 1-5    Patient:  Maty Mitchell  Age:  [de-identified]year old  Visit Diagnosis:  CNS lymphoma  Primary Rad/Onc: Radha Sheth MD    Site Delivered Dose (Gy) Prescribed Dose (Gy) Fraction #   WB 12 30 4/

## 2017-08-08 PROBLEM — R40.4 UNRESPONSIVE EPISODE: Status: ACTIVE | Noted: 2017-01-01

## 2017-08-08 PROBLEM — R41.89 UNRESPONSIVE EPISODE: Status: ACTIVE | Noted: 2017-01-01

## 2017-08-08 PROBLEM — J96.01 ACUTE RESPIRATORY FAILURE WITH HYPOXIA (HCC): Status: ACTIVE | Noted: 2017-01-01
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
## 2017-08-08 PROBLEM — R65.20 SEVERE SEPSIS (HCC): Status: ACTIVE | Noted: 2017-01-01

## 2017-08-08 PROBLEM — A41.9 SEVERE SEPSIS (HCC): Status: ACTIVE | Noted: 2017-01-01

## 2017-08-08 PROBLEM — J18.9 NOSOCOMIAL PNEUMONIA: Status: ACTIVE | Noted: 2017-01-01

## 2017-08-08 PROBLEM — I48.91 ATRIAL FIBRILLATION WITH RVR (HCC): Status: ACTIVE | Noted: 2017-01-01

## 2017-08-08 PROBLEM — I95.9 HYPOTENSION, UNSPECIFIED HYPOTENSION TYPE: Status: ACTIVE | Noted: 2017-01-01

## 2017-08-08 NOTE — PROGRESS NOTES
[facility] contacted for possible organ donation. Spoke with Trevor Nava (represntative). Referral # [id number]. Patient not a candidate for donation. No need to call back to report patient time of death.

## 2017-08-08 NOTE — PROGRESS NOTES
Spoke with Maritza Ndiaye (Cristiane #[id number]) from the 's office about patient's expiration. Zeke aware of patient's expiration. Questions and concerns answered at this time. Body okay to be released.

## 2017-08-08 NOTE — PROGRESS NOTES
08/08/17 0510   Clinical Encounter Type   Visited With Patient and family together   Crisis Visit Critical care   Family Spiritual Encounters   Family Coping Sadness   Family Participation in Care 5   Family Support During Treatment 5      offer

## 2017-08-08 NOTE — PROGRESS NOTES
Patient arrived to room. VS severely unstable. Patient to be emergent distress. Dr. Wes Carr called for evaluation.

## 2017-08-08 NOTE — PROGRESS NOTES
Received patient report from [facility]. Question and concerns answered. Ready to receive patient.

## 2017-08-08 NOTE — PROGRESS NOTES
This RN noted absence of spontaneous respiration, absence of heart tones, and absence of blood pressure. Time Of Death: 0615      This RN notified Dr. Wen Durbin regarding expiration @ 1454.      Additional notifications include: Dr. Adolfo Bernal (@ [id number]), Dr. Aarti Richter

## 2017-08-08 NOTE — PROGRESS NOTES
Nocturnist:    Patient arrived to CCU unresponsive, hypotensive on max dose levophed. On exam noted to have ischemic r leg, hypotension despite pressors, obtunded. Long conversation with family regarding the very poor prognosis.   The need for interventio

## 2017-08-08 NOTE — PROGRESS NOTES
At patient bedside. Family made aware that patient is not a candidate for organ donation. Dr. Lynn Lundberg discussed with family of discontinuing pressors and IV fluids.  At this time, family wants all medical care withdrawn and would like the patient to be comfort

## 2017-08-08 NOTE — ED PROVIDER NOTES
Patient Seen in: [facility] AND [facility] Emergency Department    History   Patient presents with:  Altered Mental Status (neurologic)      HPI    The patient presents after being sent from [address] [place] [address] for unresponsiveness.   Patient is shmuel tablet Rfl: 3 Taking   LISINOPRIL 2.5 MG Oral Tab TAKE 1 TABLET (2.5 MG TOTAL) BY MOUTH EVERY MORNING. Disp: 90 tablet Rfl: 3 Taking   multivitamin Oral Tab Take 1 tablet by mouth daily.  Disp: 1 tablet Rfl: 0 Taking   Probiotic Product (PROBIOTIC DAILY) Or distress. He has no wheezes. He has rales. Significant tachypnea, diffuse coarse rales   Abdominal: Soft. He exhibits no distension. Musculoskeletal: He exhibits no edema or deformity.    Neurological:   Somnolent, minimally arousable   Skin: Skin is wa testing, appropriate culture is needed. PLEASE REFER TO MRSA SCREENING PROTOCOL FOR TREATMENT.    CBC W/ DIFFERENTIAL - Abnormal; Notable for the following:     HGB 18.3 (*)     HCT 55.2 (*)     RDW 15.7 (*)      (*)     Band % 22 (*)     Lymphocy NaCl infusion ( Intravenous Stopped 8/8/17 0500)   norepinephrine (LEVOPHED) 4 mg/250 ml premix infusion (0 mcg/min Intravenous Stopped 8/8/17 0500)   DilTIAZem HCl (CARDIZEM) injection 20 mg (20 mg Intravenous Given 8/8/17 0055)   morphINE sulfate (PF) 4 Abscessed tooth; Intracerebral mass; Altered mental status; Elevated troponin; Sick sinus syndrome ([facility] [address] Goals of care, counseling/discussion; Advance care planning; NPH (normal pressure hydrocephalus); Primary CNS lymphoma ([facility] [address]  Atrial fibrillation with lidocaine. The right internal jugular vein was punctured under ultrasound guidance then a wire introducer was placed, a triple lumen catheter was placed using Seldinger technique. No complications. Blood return low pressure, dark blood.   Patient tolerat

## 2017-08-08 NOTE — ED NOTES
Patient presents unresponsive, tachycardic, hypotensive. Shocked 3 times without cardioversion. Given 30mg total of cardizem and cardizem drip started at 10mg/hr. Patient treated for pneumonia vanco, and zosyn.  2L bolus received and fluids going at approx

## 2017-08-08 NOTE — PROGRESS NOTES
08/08/17 0732   Clinical Encounter Type   Visited With Family   Crisis Visit Death     Family will not be coming back to the hospital for expiration.  spoke with daughter over the phone and received verbal consent for all paperwork.   Chaplain lovelace

## 2017-08-08 NOTE — PROGRESS NOTES
RADIATION ONCOLOGY COMPLETION SUMMARY NOTE    DIAGNOSIS :  CNS B-cell lymphoma, not ideal chemotherapy candidate, S/P biopsy, s/p 4 of 10 planned palliative XRT treatments, unfortunately with acute MS changes with ER visit on 8/8/17 with patient expiring.

## 2017-08-14 ENCOUNTER — APPOINTMENT (OUTPATIENT)
Dept: RADIATION ONCOLOGY | Facility: HOSPITAL | Age: 82
End: 2017-08-14
Attending: RADIOLOGY
Payer: MEDICARE

## 2017-09-05 NOTE — H&P
[facility]    History and Physical    Tatiana Fernando Patient Status:  Inpatient    1935 MRN [id number]   Location [facility] 2W/SW Attending No att. providers found   Hosp Day # 1 PCP Latoya Garnica.  Candy Sagastume MD     Date:  2017 08/08/2017    08/08/2017   K 5.5 (H) 08/08/2017   CL 99 08/08/2017   CO2 19 (L) 08/08/2017    (H) 08/08/2017   CA 9.4 08/08/2017   ALB 3.2 (L) 07/06/2017   ALKPHO 56 07/06/2017   BILT 1.4 (H) 07/06/2017   TP 6.4 07/06/2017   AST 58 (H) 07/06/2

## 2017-11-01 NOTE — DISCHARGE SUMMARY
[facility]  Discharge Summary    Aleksandra Barone Patient Status:  Inpatient    1935 MRN [id number]   Location [facility] 2W/SW Attending No att. providers found   Hosp Day # 1 PCP Chante Victor.  Skyler Kennedy MD     Date of Admission: 2017    D mouth 2 (two) times daily with meals. , Print, Disp-28 tablet, R-0    METOPROLOL SUCCINATE ER 25 MG Oral Tablet 24 Hr  TAKE 1/2 TABLET (12.5 MGTOTAL) BY MOUTH DAILY., Normal, Disp-45 tablet, R-3    LISINOPRIL 2.5 MG Oral Tab  TAKE 1 TABLET (2.5 MG TOTAL) BY

## 2019-03-01 VITALS — HEART RATE: 64 BPM | RESPIRATION RATE: 16 BRPM | SYSTOLIC BLOOD PRESSURE: 106 MMHG | DIASTOLIC BLOOD PRESSURE: 60 MMHG

## 2019-03-01 VITALS
RESPIRATION RATE: 18 BRPM | WEIGHT: 162 LBS | BODY MASS INDEX: 24.55 KG/M2 | HEIGHT: 68 IN | DIASTOLIC BLOOD PRESSURE: 66 MMHG | HEART RATE: 78 BPM | OXYGEN SATURATION: 95 % | SYSTOLIC BLOOD PRESSURE: 108 MMHG

## 2024-12-18 NOTE — PROGRESS NOTES
Priti Adamson   10/31/2016 2:00 PM   Office Visit   MRN:  [id number]    Description: 79year old male   Provider: Rahel Chung DPM   Department: [facility]             Scanning Cover Sheet         Click to print Stephanie Merritt 853 for s male.    HPI    Routine Foot & Toenail Care  Patient has arrived for routine foot and toenail care. This problem is chronic and has been occuring for several years. No new complaints or problems. Denies associated wounds, foot pain, or back pain.  For treatm DAILY) Oral Cap  Take 1 capsule by mouth daily.  Disp:   Rfl:     Melatonin 5 MG Oral Tab  Take 1 tablet by mouth nightly as needed.  Disp:   Rfl:       Allergies:No Known Allergies   PHYSICAL EXAM:    Physical Exam   Constitutional: He is oriented to pers time of 4 seconds. Positive for dermatophytosis of toenails which are thickened, lengthened, and discolored. Negative for ulcers or open sores.  I did reduce the pt's toenails for him in office today. He tolerated the reduction well.  Pt will revisit us in no

## 2025-03-25 NOTE — PROGRESS NOTES
Gianna Gallego  : 1935  Age [de-identified]year old  male patient is admitted to [facility] 7/15/17  for Rehabilitation     Admitted to [facility] AND [facility] on:17 to 7/15/17    Chief complaint: hemorrhagic mass c/w DLBCL, vasogenic edema, AMS, Pacemaker, recommended to the nurses and aides that they write messages to him, he can read them. . Now has an air mattress and pad placed next to bed for seizure precautions.  Con to be  monitored by CNA's and RN.  Fall risk parameters in place.  Cont on decadron 4m TABLET (12.5 MGTOTAL) BY MOUTH DAILY. Disp: 45 tablet Rfl: 3   LISINOPRIL 2.5 MG Oral Tab TAKE 1 TABLET (2.5 MG TOTAL) BY MOUTH EVERY MORNING. Disp: 90 tablet Rfl: 3   multivitamin Oral Tab Take 1 tablet by mouth daily.  Disp: 1 tablet Rfl: 0   Probiotic Pr lost  LINES, TUBES, DRAINS:  none  SKIN: no rashes, no suspicious lesions  WOUND: head incision with sutures intact, no s/s of infection  EYES: PERRLA, EOMI, sclera anicteric, conjunctiva normal; there is no nystagmus, no drainage from eyes  HENT: normocep Constipation  Resolved  -cont meds as ordered for constipation, prn colace bid and miralax  -please keep track of BM's daily  -reports bm this am per RN     3. Hyperglycemia A1C 5.8  -may need  sliding scale, currently not on insulin  -BS controlled  -is o patient and/or coordinating care.     MOISES Chris  07/21/17   2:26 PM 115

## (undated) DEVICE — D-58 TAPERED ROUTER

## (undated) DEVICE — CHLORAPREP 26ML APPLICATOR

## (undated) DEVICE — PROXIMATE SKIN STAPLERS (35 WIDE) CONTAINS 35 STAINLESS STEEL STAPLES (FIXED HEAD): Brand: PROXIMATE

## (undated) DEVICE — GOWN SURG AERO BLUE PERF LG

## (undated) DEVICE — 12 ML SYRINGE LUER-LOCK TIP: Brand: MONOJECT

## (undated) DEVICE — SUCTION ESCP MLBL SCT 12FR STD

## (undated) DEVICE — BLADE CRANI SHAVER 4412A

## (undated) DEVICE — NON-ADHERENT PAD PREPACK: Brand: TELFA

## (undated) DEVICE — SUCTION CANISTER, 3000CC,SAFELINER: Brand: DEROYAL

## (undated) DEVICE — STERILE LATEX POWDER-FREE SURGICAL GLOVESWITH NITRILE COATING: Brand: PROTEXIS

## (undated) DEVICE — REM POLYHESIVE ADULT PATIENT RETURN ELECTRODE: Brand: VALLEYLAB

## (undated) DEVICE — SUTURE CHROMIC GUT 3-0 SH

## (undated) DEVICE — DISPOSABLE REFLECTIVE MARKER SPHERES (DRMS) ATTACHED TO REFERENCE FRAMES AND SURGICAL INSTRUMENTS FOR USE DURING SURGICAL NAVIGATION IN IMAGE GUIDED SURGERY.  ONE RETAIL CARTON IS MADE UP OF 3 SPHERES PER TRAY, 30 TRAYS, 90 SPHERES TOTAL.: Brand: DISPOSABLE REFLECTIVE MARKER SPHERE (DRMS)

## (undated) DEVICE — DISPOSABLE BIOPSY NEEDLE WITH MARKER PLATES.   FOR RE-ORDER: 41779C (5 PCS OF 41778C): Brand: DISPOSABLE BIOPSY NEEDLE

## (undated) DEVICE — SUTURE VICRYL 3-0 J790D

## (undated) DEVICE — CRANIOTOMY: Brand: MEDLINE INDUSTRIES, INC.

## (undated) DEVICE — COVER SGL STRL LGHT HNDL BLU

## (undated) DEVICE — KENDALL SCD EXPRESS SLEEVES, KNEE LENGTH, MEDIUM: Brand: KENDALL SCD

## (undated) DEVICE — PEN: MARKING STD PT 100/CS: Brand: MEDICAL ACTION INDUSTRIES

## (undated) DEVICE — STANDARD HYPODERMIC NEEDLE,POLYPROPYLENE HUB: Brand: MONOJECT

## (undated) DEVICE — MAYFIELD® DISPOSABLE ADULT SKULL PIN (STAINLESS STEEL): Brand: MAYFIELD®

## (undated) DEVICE — CODMAN® DISPOSABLE PERFORATOR 14MM: Brand: CODMAN®

## (undated) DEVICE — SOL  .9 1000ML BTL

## (undated) DEVICE — TOWEL OR BLU 16X26 STRL

## (undated) NOTE — MR AVS SNAPSHOT
[facility]  [address] [place] [address], [address] [place] [address]  [phone]               Thank you for choosing us for your health care visit with Tonny Solomon DPM.  We are glad to serve you and happy to provide you with this summary of yo Commonly known as:  HYDRODIURIL           lisinopril 2.5 MG Tabs   Take 1 tablet (2.5 mg total) by mouth every morning. Commonly known as:  PRINIVIL,ZESTRIL           Melatonin 5 MG Tabs   Take 1 tablet by mouth nightly as needed.            Metoprolol Chaudhary

## (undated) NOTE — IP AVS SNAPSHOT
[facility]            (For Outpatient Use Only) Initial Admit Date: 7/5/2017   Inpt/Obs Admit Date: Inpt: 7/5/17 / Obs: N/A   Discharge Date:    Gertrudis Mora:  [de-identified]   MRN: [de-identified]   CSN: [id number]        ENCOUNTER  Patient Class: Hospital Account Financial Class: Medicare    July 15, 2017

## (undated) NOTE — LETTER
[address] [place] [address] Rd, [place], [place]     AUTHORIZATION FOR SURGICAL OPERATION OR PROCEDURE    1.  I hereby authorize Dr. Sylwia Miller MD, my Physician(s) and whomever may be designated as the doctor's Assistant, to perform the fol 5. I consent to the photographing of procedure(s) to be performed for the purposes of advancing medicine, science and/or education, provided my identity is not revealed.  If the procedure has been videotaped, the physician/surgeon will obtain the original v (Witness signature)                                                                                                  (Date)                                (Time)  STATEMENT OF PHYSICIAN My signature below affirms that prior to the time of the procedure;  I

## (undated) NOTE — LETTER
[facility]  Administration of Anesthesia  1. Patricia Aponte, or _________________________________ acting on his behalf, (Patient) (Dependent/Representative) request to receive anesthesia for my pending procedure/operation/treatment.   A infections, high spinal block, spinal bleeding, seizure, cardiac arrest and death. 7. AWARENESS: I understand that it is possible (but unlikely) to have explicit memory of events from the operating room while under general anesthesia.   8. ELECTROCONVULSIV (Date) (Time)                                                                                               (Responsible person in case of minor/ unconscious pt) /Relationship    My signature below affirms that prior to the time of the procedure, I have ex

## (undated) NOTE — IP AVS SNAPSHOT
Patient Demographics     Address  [address] Phone  [phone] (Home) *Preferred* E-mail Address  [email or web address]      Emergency Contact(s)     Name Relation Home Work Mobile    Mao Daughter [phone]      Tammy Liriano Aixa Olmos MD         Metoprolol Succinate ER 25 MG Tb24  Commonly known as: Toprol XL      TAKE 1/2 TABLET (12.5 MGTOTAL) BY MOUTH DAILY. Aixa Olmos MD         multivitamin Tabs      Take 1 tablet by mouth daily.    Aixa Olmos MD Order Status:  Completed Lab Status:  Final result Updated:  07/06/17 1841    Specimen:  Stool from Stool      GI Panel Result: --    Emergency MRSA Screen by PCR STAT [[id number]] Collected:  07/05/17 2132    Order Status:  Completed Lab Status:  Final re status, weakness, and gait instability. He apparently had an unwitnessed fall on the day of admission.   He apparently had a large amount of diarrhea throughout the day yesterday, and family believes he probably fell, trying to either get to the bathroom o He quit in 2013. Does occasionally drink alcohol. Apparently lives alone. REVIEW OF SYSTEMS:  Unobtainable. PHYSICAL EXAMINATION:    GENERAL:  He was sleeping soundly. Did arouse to my voice at times with a startle. Did not speak intelligibly. complaining of pain in the emergency room. There was soft tissue swelling without acute fracture or dislocation. The patient also had pelvis x-ray for trauma. There were degenerative changes seen without acute fracture. ASSESSMENT AND PLAN:    1. not be discussed with him in his current condition. No family members were available at the time that I saw the patient. Further discussion with the family will need to be had in the morning.     Dictated By Germaine Sherman MD  d: 07/06/2017 06:12 Rectal fecal impaction measuring 9.5 x 8.1 cm transversely. 4. Left renal cortical cyst. Right renal cortical scarring. 5. Ventriculoperitoneal shunt extends in the right lower quadrant region.  6. No significant lymphadenopathy within the chest abdomen or Cigarettes. He started smoking about 42 years ago. He quit after 40.00 years of use. He has never used smokeless tobacco. He reports that he drinks alcohol. He reports that he does not use drugs. PAST FAMILY HISTORY   family history is not on file.     P RBC 4.38 (L) 4.50 - 5.90 M/UL   HGB 13.8 13.5 - 17.5 g/dL   HCT 40.2 (L) 41.0 - 52.0 %   MCV 91.8 80.0 - 100.0 fL   MCH 31.5 27.0 - 32.0 pg   MCHC 34.3 32.0 - 37.0 g/dl   RDW 13.9 11.0 - 15.0 %    (L) 140 - 400 K/UL   MPV 9.7 7.4 - 10.3 fL   Neutrop Basophil % 0 %   Neutrophil Absolute 6.4 1.8 - 7.7 K/UL   Lymphocyte Absolute 0.7 (L) 1.0 - 4.0 K/UL   Monocyte Absolute 0.7 0.0 - 1.0 K/UL   Eosinophil Absolute 0.0 0.0 - 0.7 K/UL   Basophil Absolute 0.0 0.0 - 0.2 K/UL   -POCT GLUCOSE   Collection Time: 0 He has improvement on steroids and ideally he continues to progress with rehab. I have discussed the role of whole brain XRT for palliation in detail with the patients wife.   I have explained the natural history of the disease and the goals of palliativ staining for CD20, BCL-6,  C-myc, BCL-2, and MUM-1. They are negative for CD10, CD43, CD3, CD30, CD5, cyclin D1, cytokeratin AE1/3, kappa and lambda immunoglobulin light chains. Controls demonstrate appropriate reactivity.  Overall, these findings are cons PHYSICAL THERAPY ASSESSMENT     Patient is a[YIN.3 [de-identified]year old[YIN.2] male admitted 7/5/2017 for intracerebral mass, s/p craniotomy.   Patient's current functional deficits include decreased sitting balance with impaired mobility, which are below the patient 7-: OTHER SURGICAL HISTORY      Comment: shunt put in[YIN.2]    HOME SITUATION[YIN.1]  Type of Home: House   Home Layout: Multi-level                Lives With: Spouse;Caregiver part-time  Drives: No  Patient Owned Equipment: Rolling walker[YIN.2] wheelchair, bedside commode, etc.): Unable   -   Moving from lying on back to sitting on the side of the bed?: Unable[YIN.2]   How much help from another person does the patient currently need. ..[YIN.1]   -   Moving to and from a bed to a chair (including a Goal #4 Patient will negotiate 0 stairs/one curb w/ assistive device and supervision   Goal #4   Current Status    Goal #5 Patient to demonstrate independence with home activity/exercise instructions provided to patient in preparation for discharge.    Goal simple commands including \"raise your left leg\". Pt requires max Ax2 for bed mobility for rolling and supine-sit. Pt sat EOB x4 minutes Mod/max A and BUE on bed rails.  Pt demonstrates leaning posteriorly and resisting when asked to lean forward with tact OCCUPATIONAL THERAPY EXAMINATION      OBJECTIVE[NH.1]  Precautions: Hard of hearing;Bed/chair alarm  Fall Risk: High fall risk[NH.2]    PAIN ASSESSMENT[NH.1]  Rating: Unable to rate  Location: Rt leg[NH.2]       ACTIVITY TOLERANCE  Room air    COGNITION  U Lower Extremity Dressing: Dep[NH.1]    Patient End of Session: In bed;Call light within reach;RN aware of session/findings; Alarm set[NH.2]      OT Goals     Patient will complete bed mobility in prep for dressing Max A with use of tactile/visual cues  Comm functional swallowing skills and goal achievement. Diet Recommendations - Solids: Mechanical soft chopped  Diet Recommendations - Liquid: Thin    Compensatory Strategies Recommended: No straws; Slow rate; Alterante consistencies;Small bites and sips; Mu Goal #4 The patient will utilize compensatory strategies as outlined by  BSSE (clinical evaluation) including Slow rate, Small bites, Small sips, Multiple swallows, No straws, Upright 90 degrees with mild assistance 100 % of the time across 2 sessions.

## (undated) NOTE — Clinical Note
3/20/2017        Dear Melchor Li. MD [facility],    Thank you for allowing me to participate in your patient's care. We appreciate your confidence in their care for your patient.     The patient will find the results of our examination and our treatment recomme

## (undated) NOTE — LETTER
[facility] [address], [place], [place]  [address]  INFORMED CONSENT FOR TRANSFUSION OF BLOOD OR BLOOD PRODUCTS   My physician has informed me of the nature, purpose, benefits and risks of transfusion for blood and blood components jaxon (Signature of Patient)                                                           (Responsible party in case of Minor,                                                                                                Incompetent, or unconscious Patient)  _____

## (undated) NOTE — MR AVS SNAPSHOT
[facility] [place]  [address] [place] [address]  [phone]               Thank you for choosing us for your health care visit with Boogie St. MD [facility].  We are glad to serve you and happy to provide you with this summary of your visit.   Masha multivitamin Tabs   Take 1 tablet by mouth daily. PROBIOTIC DAILY Caps   Take 1 capsule by mouth daily.                    MyChart               Educational Information     TOP FALL PREVENTION TIPS    INSIDE YOUR HOME   KITCHEN:  ? Use non skid m

## (undated) NOTE — MR AVS SNAPSHOT
[facility] [place]  [address] [facility] [address]  [phone]               Thank you for choosing us for your health care visit with Ryan Knapp MD.  We are glad to serve you and happy to provide you with this summary of your visit.   Masha [E78.00], Pacemaker [Z95.0]           Urinalysis, Routine [E]    Complete by:   Mar 06, 2017    Assoc Dx:  Abscessed tooth [K04.7], [facility] unspecified [facility] ([facility] [I48.91], Essential hypertension [I10], Pure hypercholesterolemia [E78.00], Pacem

## (undated) NOTE — LETTER
[facility]  Administration of Anesthesia  1. Chadd Minaya, or _________________________________ acting on his behalf, (Patient) (Dependent/Representative) request to receive anesthesia for my pending procedure/operation/treatment.   A infections, high spinal block, spinal bleeding, seizure, cardiac arrest and death. 7. AWARENESS: I understand that it is possible (but unlikely) to have explicit memory of events from the operating room while under general anesthesia.   8. ELECTROCONVULSIV (Date) (Time)                                                                                               (Responsible person in case of minor/ unconscious pt) /Relationship    My signature below affirms that prior to the time of the procedure, I have ex

## (undated) NOTE — LETTER
[facility] [address]  [facility] [address], [place], [place]  [address]  INFORMED CONSENT FOR TRANSFUSION OF BLOOD OR BLOOD PRODUCTS   My physician has informed me of the nature, purpose, benefits and risks of transfusion for blood and blood components jaxon (Signature of Patient)                                                           (Responsible party in case of Minor,                                                                                                Incompetent, or unconscious Patient)  _____

## (undated) NOTE — LETTER
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 There was a mass effect as well with about 3 mm of rightward midline shift.   The emergency room physician spoke with both Dr. Vaughn Alexander who Commonly known as:  AUGMENTIN      Take 1 tablet by mouth 2 (two) times daily.    Stop taking on:  7/19/2017  Quantity:  8 tablet  Refills:  0     dexamethasone 4 MG tablet  Commonly known as:  DECADRON      Take 1 tablet (4 mg total) by mouth 2 (two) times [facility] [address]  [phone]   Cynthia Melissa MD  In 1 week    [place]   [phone]          I answered all the patient's questions spending >30 minutes with patient in well over half time in face-to-face discussion of